# Patient Record
Sex: MALE | Race: WHITE | NOT HISPANIC OR LATINO | Employment: FULL TIME | URBAN - METROPOLITAN AREA
[De-identification: names, ages, dates, MRNs, and addresses within clinical notes are randomized per-mention and may not be internally consistent; named-entity substitution may affect disease eponyms.]

---

## 2023-07-11 DIAGNOSIS — Z12.5 ENCOUNTER FOR SCREENING FOR MALIGNANT NEOPLASM OF PROSTATE: ICD-10-CM

## 2023-07-11 DIAGNOSIS — E55.9 VITAMIN D DEFICIENCY: ICD-10-CM

## 2023-07-11 DIAGNOSIS — E53.8 VITAMIN B12 DEFICIENCY: ICD-10-CM

## 2023-07-11 DIAGNOSIS — Z00.00 ENCOUNTER FOR ANNUAL GENERAL MEDICAL EXAMINATION WITHOUT ABNORMAL FINDINGS IN ADULT: ICD-10-CM

## 2023-07-17 ENCOUNTER — LAB (OUTPATIENT)
Dept: LAB | Facility: LAB | Age: 53
End: 2023-07-17
Payer: COMMERCIAL

## 2023-07-17 DIAGNOSIS — E53.8 VITAMIN B12 DEFICIENCY: ICD-10-CM

## 2023-07-17 DIAGNOSIS — Z12.5 ENCOUNTER FOR SCREENING FOR MALIGNANT NEOPLASM OF PROSTATE: ICD-10-CM

## 2023-07-17 DIAGNOSIS — E55.9 VITAMIN D DEFICIENCY: ICD-10-CM

## 2023-07-17 DIAGNOSIS — Z00.00 ENCOUNTER FOR ANNUAL GENERAL MEDICAL EXAMINATION WITHOUT ABNORMAL FINDINGS IN ADULT: ICD-10-CM

## 2023-07-17 LAB
ALANINE AMINOTRANSFERASE (SGPT) (U/L) IN SER/PLAS: 29 U/L (ref 10–52)
ALBUMIN (G/DL) IN SER/PLAS: 4.7 G/DL (ref 3.4–5)
ALKALINE PHOSPHATASE (U/L) IN SER/PLAS: 73 U/L (ref 33–120)
ANION GAP IN SER/PLAS: 14 MMOL/L (ref 10–20)
ASPARTATE AMINOTRANSFERASE (SGOT) (U/L) IN SER/PLAS: 29 U/L (ref 9–39)
BASOPHILS (10*3/UL) IN BLOOD BY AUTOMATED COUNT: 0.06 X10E9/L (ref 0–0.1)
BASOPHILS/100 LEUKOCYTES IN BLOOD BY AUTOMATED COUNT: 0.9 % (ref 0–2)
BILIRUBIN TOTAL (MG/DL) IN SER/PLAS: 0.9 MG/DL (ref 0–1.2)
CALCIDIOL (25 OH VITAMIN D3) (NG/ML) IN SER/PLAS: 32 NG/ML
CALCIUM (MG/DL) IN SER/PLAS: 10 MG/DL (ref 8.6–10.6)
CARBON DIOXIDE, TOTAL (MMOL/L) IN SER/PLAS: 29 MMOL/L (ref 21–32)
CHLORIDE (MMOL/L) IN SER/PLAS: 105 MMOL/L (ref 98–107)
CHOLESTEROL (MG/DL) IN SER/PLAS: 216 MG/DL (ref 0–199)
CHOLESTEROL IN HDL (MG/DL) IN SER/PLAS: 51.7 MG/DL
CHOLESTEROL/HDL RATIO: 4.2
COBALAMIN (VITAMIN B12) (PG/ML) IN SER/PLAS: 556 PG/ML (ref 211–911)
CREATININE (MG/DL) IN SER/PLAS: 1.04 MG/DL (ref 0.5–1.3)
EOSINOPHILS (10*3/UL) IN BLOOD BY AUTOMATED COUNT: 0.08 X10E9/L (ref 0–0.7)
EOSINOPHILS/100 LEUKOCYTES IN BLOOD BY AUTOMATED COUNT: 1.2 % (ref 0–6)
ERYTHROCYTE DISTRIBUTION WIDTH (RATIO) BY AUTOMATED COUNT: 11.7 % (ref 11.5–14.5)
ERYTHROCYTE MEAN CORPUSCULAR HEMOGLOBIN CONCENTRATION (G/DL) BY AUTOMATED: 33 G/DL (ref 32–36)
ERYTHROCYTE MEAN CORPUSCULAR VOLUME (FL) BY AUTOMATED COUNT: 90 FL (ref 80–100)
ERYTHROCYTES (10*6/UL) IN BLOOD BY AUTOMATED COUNT: 5.08 X10E12/L (ref 4.5–5.9)
GFR MALE: 85 ML/MIN/1.73M2
GLUCOSE (MG/DL) IN SER/PLAS: 92 MG/DL (ref 74–99)
HEMATOCRIT (%) IN BLOOD BY AUTOMATED COUNT: 45.7 % (ref 41–52)
HEMOGLOBIN (G/DL) IN BLOOD: 15.1 G/DL (ref 13.5–17.5)
IMMATURE GRANULOCYTES/100 LEUKOCYTES IN BLOOD BY AUTOMATED COUNT: 0.1 % (ref 0–0.9)
LDL: 142 MG/DL (ref 0–99)
LEUKOCYTES (10*3/UL) IN BLOOD BY AUTOMATED COUNT: 6.7 X10E9/L (ref 4.4–11.3)
LYMPHOCYTES (10*3/UL) IN BLOOD BY AUTOMATED COUNT: 3.06 X10E9/L (ref 1.2–4.8)
LYMPHOCYTES/100 LEUKOCYTES IN BLOOD BY AUTOMATED COUNT: 45.5 % (ref 13–44)
MONOCYTES (10*3/UL) IN BLOOD BY AUTOMATED COUNT: 0.47 X10E9/L (ref 0.1–1)
MONOCYTES/100 LEUKOCYTES IN BLOOD BY AUTOMATED COUNT: 7 % (ref 2–10)
NEUTROPHILS (10*3/UL) IN BLOOD BY AUTOMATED COUNT: 3.05 X10E9/L (ref 1.2–7.7)
NEUTROPHILS/100 LEUKOCYTES IN BLOOD BY AUTOMATED COUNT: 45.3 % (ref 40–80)
NRBC (PER 100 WBCS) BY AUTOMATED COUNT: 0 /100 WBC (ref 0–0)
PLATELETS (10*3/UL) IN BLOOD AUTOMATED COUNT: 280 X10E9/L (ref 150–450)
POTASSIUM (MMOL/L) IN SER/PLAS: 4.7 MMOL/L (ref 3.5–5.3)
PROSTATE SPECIFIC ANTIGEN,SCREEN: 0.22 NG/ML (ref 0–4)
PROTEIN TOTAL: 7.1 G/DL (ref 6.4–8.2)
SODIUM (MMOL/L) IN SER/PLAS: 143 MMOL/L (ref 136–145)
THYROTROPIN (MIU/L) IN SER/PLAS BY DETECTION LIMIT <= 0.05 MIU/L: 0.69 MIU/L (ref 0.44–3.98)
TRIGLYCERIDE (MG/DL) IN SER/PLAS: 114 MG/DL (ref 0–149)
UREA NITROGEN (MG/DL) IN SER/PLAS: 10 MG/DL (ref 6–23)
VLDL: 23 MG/DL (ref 0–40)

## 2023-07-17 PROCEDURE — 82607 VITAMIN B-12: CPT

## 2023-07-17 PROCEDURE — 80061 LIPID PANEL: CPT

## 2023-07-17 PROCEDURE — 84443 ASSAY THYROID STIM HORMONE: CPT

## 2023-07-17 PROCEDURE — 85025 COMPLETE CBC W/AUTO DIFF WBC: CPT

## 2023-07-17 PROCEDURE — 82306 VITAMIN D 25 HYDROXY: CPT

## 2023-07-17 PROCEDURE — 84153 ASSAY OF PSA TOTAL: CPT

## 2023-07-17 PROCEDURE — 80053 COMPREHEN METABOLIC PANEL: CPT

## 2023-07-17 PROCEDURE — 36415 COLL VENOUS BLD VENIPUNCTURE: CPT

## 2023-07-19 ENCOUNTER — OFFICE VISIT (OUTPATIENT)
Dept: PRIMARY CARE | Facility: CLINIC | Age: 53
End: 2023-07-19
Payer: COMMERCIAL

## 2023-07-19 VITALS
RESPIRATION RATE: 16 BRPM | SYSTOLIC BLOOD PRESSURE: 156 MMHG | HEIGHT: 74 IN | HEART RATE: 68 BPM | OXYGEN SATURATION: 98 % | DIASTOLIC BLOOD PRESSURE: 81 MMHG | WEIGHT: 206.6 LBS | BODY MASS INDEX: 26.51 KG/M2 | TEMPERATURE: 97.7 F

## 2023-07-19 DIAGNOSIS — Z00.00 ENCOUNTER FOR ANNUAL GENERAL MEDICAL EXAMINATION WITHOUT ABNORMAL FINDINGS IN ADULT: Primary | ICD-10-CM

## 2023-07-19 DIAGNOSIS — E78.2 MIXED HYPERLIPIDEMIA: ICD-10-CM

## 2023-07-19 DIAGNOSIS — R01.1 CARDIAC MURMUR: ICD-10-CM

## 2023-07-19 DIAGNOSIS — I10 HTN (HYPERTENSION), BENIGN: ICD-10-CM

## 2023-07-19 PROCEDURE — 93000 ELECTROCARDIOGRAM COMPLETE: CPT | Performed by: FAMILY MEDICINE

## 2023-07-19 PROCEDURE — 3077F SYST BP >= 140 MM HG: CPT | Performed by: FAMILY MEDICINE

## 2023-07-19 PROCEDURE — 99386 PREV VISIT NEW AGE 40-64: CPT | Performed by: FAMILY MEDICINE

## 2023-07-19 PROCEDURE — 1036F TOBACCO NON-USER: CPT | Performed by: FAMILY MEDICINE

## 2023-07-19 PROCEDURE — 3079F DIAST BP 80-89 MM HG: CPT | Performed by: FAMILY MEDICINE

## 2023-07-19 PROCEDURE — 99204 OFFICE O/P NEW MOD 45 MIN: CPT | Performed by: FAMILY MEDICINE

## 2023-07-19 RX ORDER — LOSARTAN POTASSIUM 50 MG/1
50 TABLET ORAL DAILY
Qty: 90 TABLET | Refills: 0 | Status: SHIPPED | OUTPATIENT
Start: 2023-07-19 | End: 2023-08-24

## 2023-07-19 RX ORDER — MULTIVITAMIN
1 TABLET ORAL DAILY
COMMUNITY

## 2023-07-19 RX ORDER — BIOTIN 5 MG
5000 TABLET ORAL
COMMUNITY

## 2023-07-19 RX ORDER — ATORVASTATIN CALCIUM 10 MG/1
10 TABLET, FILM COATED ORAL DAILY
Qty: 90 TABLET | Refills: 1 | Status: SHIPPED | OUTPATIENT
Start: 2023-07-19 | End: 2023-09-18

## 2023-07-19 ASSESSMENT — ENCOUNTER SYMPTOMS
VOICE CHANGE: 0
BRUISES/BLEEDS EASILY: 0
DIARRHEA: 0
AGITATION: 0
COUGH: 0
NERVOUS/ANXIOUS: 0
OCCASIONAL FEELINGS OF UNSTEADINESS: 0
SINUS PRESSURE: 0
MYALGIAS: 0
DYSURIA: 0
EYE DISCHARGE: 0
EYE ITCHING: 0
ADENOPATHY: 0
DIAPHORESIS: 0
WOUND: 0
DIZZINESS: 0
VOMITING: 0
PALPITATIONS: 0
NAUSEA: 0
EYE REDNESS: 0
APPETITE CHANGE: 0
NECK STIFFNESS: 0
SORE THROAT: 0
FATIGUE: 0
JOINT SWELLING: 0
WHEEZING: 0
BACK PAIN: 0
TROUBLE SWALLOWING: 0
COLOR CHANGE: 0
POLYPHAGIA: 0
CHILLS: 0
CHEST TIGHTNESS: 0
ABDOMINAL PAIN: 0
ARTHRALGIAS: 0
FEVER: 0
DEPRESSION: 0
EYE PAIN: 0
ACTIVITY CHANGE: 0
POLYDIPSIA: 0
FREQUENCY: 0
SHORTNESS OF BREATH: 0
LOSS OF SENSATION IN FEET: 0
FACIAL SWELLING: 0
SLEEP DISTURBANCE: 0
UNEXPECTED WEIGHT CHANGE: 0
HEMATURIA: 0
BLOOD IN STOOL: 0
FLANK PAIN: 0
CONSTIPATION: 0
SINUS PAIN: 0
RHINORRHEA: 0

## 2023-07-19 ASSESSMENT — PATIENT HEALTH QUESTIONNAIRE - PHQ9
2. FEELING DOWN, DEPRESSED OR HOPELESS: NOT AT ALL
1. LITTLE INTEREST OR PLEASURE IN DOING THINGS: NOT AT ALL
SUM OF ALL RESPONSES TO PHQ9 QUESTIONS 1 AND 2: 0

## 2023-07-19 NOTE — PROGRESS NOTES
Subjective   Patient ID: Soto Rojas is a 53 y.o. male who presents for Annual Exam (New pt to establish care).  Today he is accompanied by alone.     Well Adult Physical   Patient here for a comprehensive physical exam.The patient reports no problems    Do you take any herbs or supplements that were not prescribed by a doctor? no   Are you taking calcium supplements? no   Are you taking aspirin daily? no     History:  Date last PSA: July/2023            Review of Systems   Constitutional:  Negative for activity change, appetite change, chills, diaphoresis, fatigue, fever and unexpected weight change.   HENT:  Negative for congestion, dental problem, drooling, ear discharge, ear pain, facial swelling, hearing loss, nosebleeds, postnasal drip, rhinorrhea, sinus pressure, sinus pain, sneezing, sore throat, tinnitus, trouble swallowing and voice change.    Eyes:  Negative for pain, discharge, redness, itching and visual disturbance.   Respiratory:  Negative for cough, chest tightness, shortness of breath and wheezing.    Cardiovascular:  Negative for chest pain, palpitations and leg swelling.   Gastrointestinal:  Negative for abdominal pain, blood in stool, constipation, diarrhea, nausea and vomiting.   Endocrine: Negative for cold intolerance, heat intolerance, polydipsia, polyphagia and polyuria.   Genitourinary:  Negative for decreased urine volume, dysuria, flank pain, frequency, hematuria and urgency.   Musculoskeletal:  Negative for arthralgias, back pain, gait problem, joint swelling, myalgias and neck stiffness.   Skin:  Negative for color change, pallor, rash and wound.   Neurological:  Negative for dizziness.   Hematological:  Negative for adenopathy. Does not bruise/bleed easily.   Psychiatric/Behavioral:  Negative for agitation, behavioral problems and sleep disturbance. The patient is not nervous/anxious.        Objective   Blood Pressure 156/81   Pulse 68   Temperature 36.5 °C (97.7 °F)    "Respiration 16   Height 1.88 m (6' 2\")   Weight 93.7 kg (206 lb 9.6 oz)   Oxygen Saturation 98%   Body Mass Index 26.53 kg/m²   BSA: 2.21 meters squared  Growth percentiles: Facility age limit for growth %amador is 20 years. Facility age limit for growth %amador is 20 years.     Physical Exam  Vitals and nursing note reviewed.   Constitutional:       General: He is not in acute distress.     Appearance: Normal appearance. He is normal weight. He is not ill-appearing.   HENT:      Head: Normocephalic.      Right Ear: Tympanic membrane, ear canal and external ear normal.      Left Ear: Tympanic membrane, ear canal and external ear normal.      Nose: Nose normal. No congestion or rhinorrhea.      Mouth/Throat:      Mouth: Mucous membranes are moist.      Pharynx: Oropharynx is clear.   Eyes:      Extraocular Movements: Extraocular movements intact.      Conjunctiva/sclera: Conjunctivae normal.      Pupils: Pupils are equal, round, and reactive to light.   Cardiovascular:      Rate and Rhythm: Normal rate and regular rhythm.      Pulses: Normal pulses.      Heart sounds: Murmur heard.   Pulmonary:      Effort: Pulmonary effort is normal. No respiratory distress.      Breath sounds: Normal breath sounds. No wheezing.   Abdominal:      General: Abdomen is flat. Bowel sounds are normal.      Palpations: Abdomen is soft.      Tenderness: There is no abdominal tenderness. There is no guarding or rebound.      Hernia: No hernia is present.   Musculoskeletal:         General: Normal range of motion.      Cervical back: Normal range of motion and neck supple. No rigidity or tenderness.      Right lower leg: No edema.      Left lower leg: No edema.   Lymphadenopathy:      Cervical: No cervical adenopathy.   Skin:     General: Skin is warm and dry.      Capillary Refill: Capillary refill takes more than 3 seconds.   Neurological:      General: No focal deficit present.      Mental Status: He is alert and oriented to person, " place, and time.      Sensory: No sensory deficit.      Motor: No weakness.      Coordination: Coordination normal.   Psychiatric:         Mood and Affect: Mood normal.         Behavior: Behavior normal.         Thought Content: Thought content normal.         Judgment: Judgment normal.         Assessment/Plan   Problem List Items Addressed This Visit       Mixed hyperlipidemia    Relevant Medications    atorvastatin (Lipitor) 10 mg tablet    HTN (hypertension), benign    Relevant Medications    losartan (Cozaar) 50 mg tablet    Cardiac murmur    Relevant Orders    ECG 12 lead (Completed)     Other Visit Diagnoses       Encounter for annual general medical examination without abnormal findings in adult    -  Primary    Relevant Orders    ECG 12 lead (Completed)        The 10-year ASCVD risk score (Nolberto LACY, et al., 2019) is: 7.6%    Values used to calculate the score:      Age: 53 years      Sex: Male      Is Non- : No      Diabetic: No      Tobacco smoker: No      Systolic Blood Pressure: 156 mmHg      Is BP treated: No      HDL Cholesterol: 51.7 mg/dL      Total Cholesterol: 216 mg/dL    Dear Mr. Rojas:     To help you more effectively manage your high blood pressure, we would like to remind you of the following preventive measures you can take at home:    1) Eat right: Your diet should be rich in fruits, vegetables, potassium, and low-fat dairy products. You should also reduce your intake of sodium and fats, particularly saturated fats.    2) Maintain a healthy weight: Try to achieve and maintain a healthy weight. If you are unsure what this means for you, please contact our clinic.    3) Exercise: Try to get at least 30 minutes of aerobic exercise every day.    4) Moderate your alcohol consumption: Limit your alcohol intake to one drink per day.    5) Monitor your blood pressure: You should check your blood pressure regularly. Notify our clinic if your blood pressure readings are  consistently higher than recommended.    We have included a personalized hypertension report card on the following page that lists your most recent blood pressure readings and lab results, along with your ideal values. If you have any questions or concerns about these instructions, your results, or your improving health, please don't hesitate to call.    Thank you for including us as members of your health care team.    [unfilled]    Sincerely,         Jocelyne Olivas MD    Enclosure      Hypertension Report Card for Soto Rojas  July 19, 2023:     Below is a summary of recent tests related to your hypertension that can help you manage your health.     Blood Pressure:   Normal blood pressure values are 120/80 or lower. Your blood pressure values should be less than 140/90. If your readings at home are consistently higher than this, please contact me.     Your most recent blood pressure readings at our clinic were:   BP Readings from Last 3 Encounters:   07/19/23 156/81       Creatinine:   High blood pressure can cause a loss of kidney function. Creatinine is a measure of this kidney function.      Your most recent creatinine levels were:   Creatinine (mg/dL)   Date Value   07/17/2023 1.04           Potassium:  Potassium is an electrolyte in your blood that can be affected by blood pressure treatment.     Your most recent potassium levels were:  Potassium (mmol/L)   Date Value   07/17/2023 4.7     F/U in 1 month

## 2023-07-19 NOTE — PATIENT INSTRUCTIONS
Current Outpatient Medications   Medication Sig Dispense Refill    biotin 5 mg tablet Take 1 tablet (5 mg) by mouth once daily.      multivitamin tablet Take 1 tablet by mouth once daily.      atorvastatin (Lipitor) 10 mg tablet Take 1 tablet (10 mg) by mouth once daily. 90 tablet 1    losartan (Cozaar) 50 mg tablet Take 1 tablet (50 mg) by mouth once daily. 90 tablet 0     No current facility-administered medications for this visit.     ou have high cholesterol. (hyperlipidemia). This increases your risk for cardiovascular disease.(Heart attack/stroke/circulation);  Continue any prescribed and advised OTC medications, in addition, as reminder:;   For high LDL (>130) use blueberries 1 cup daily;   Plant Stanol dose 950mg twice daily (may need to find online source for this dose), and ;    Metamucil/psyllium fiber 7 grams daily.;  ;  For high Triglycerides:;   Fish oil (start at 2000-3000mg daily);  ;   You should exercise 30 minutes daily. ;  ;   Your nutrition plan needs to emphasizes vegetables, fruits, and lean meat. I recommend the Mediterranean Diet ;  Please find this link to helpful information about lowering your cholesterol: http://www.aafp.org/afp/2010/0501/p1103.html;  Dear Mr. Rojas:     To help you more effectively manage your high blood pressure, we would like to remind you of the following preventive measures you can take at home:    1) Eat right: Your diet should be rich in fruits, vegetables, potassium, and low-fat dairy products. You should also reduce your intake of sodium and fats, particularly saturated fats.    2) Maintain a healthy weight: Try to achieve and maintain a healthy weight. If you are unsure what this means for you, please contact our clinic.    3) Exercise: Try to get at least 30 minutes of aerobic exercise every day.    4) Moderate your alcohol consumption: Limit your alcohol intake to one drink per day.    5) Monitor your blood pressure: You should check your blood pressure  regularly. Notify our clinic if your blood pressure readings are consistently higher than recommended.    We have included a personalized hypertension report card on the following page that lists your most recent blood pressure readings and lab results, along with your ideal values. If you have any questions or concerns about these instructions, your results, or your improving health, please don't hesitate to call.    Thank you for including us as members of your health care team.    [unfilled]    Sincerely,         Jocelyne Olivas MD    Enclosure      Hypertension Report Card for Soto Rojas  July 19, 2023:     Below is a summary of recent tests related to your hypertension that can help you manage your health.     Blood Pressure:   Normal blood pressure values are 120/80 or lower. Your blood pressure values should be less than 140/90. If your readings at home are consistently higher than this, please contact me.     Your most recent blood pressure readings at our clinic were:   BP Readings from Last 3 Encounters:   07/19/23 156/81       Creatinine:   High blood pressure can cause a loss of kidney function. Creatinine is a measure of this kidney function.      Your most recent creatinine levels were:   Creatinine (mg/dL)   Date Value   07/17/2023 1.04           Potassium:  Potassium is an electrolyte in your blood that can be affected by blood pressure treatment.     Your most recent potassium levels were:  Potassium (mmol/L)   Date Value   07/17/2023 4.7         F/U in 1 month for hyperlipidemia and BP

## 2023-08-23 ENCOUNTER — OFFICE VISIT (OUTPATIENT)
Dept: PRIMARY CARE | Facility: CLINIC | Age: 53
End: 2023-08-23
Payer: COMMERCIAL

## 2023-08-23 VITALS
OXYGEN SATURATION: 95 % | TEMPERATURE: 98.2 F | HEART RATE: 68 BPM | RESPIRATION RATE: 16 BRPM | WEIGHT: 206 LBS | DIASTOLIC BLOOD PRESSURE: 76 MMHG | BODY MASS INDEX: 26.45 KG/M2 | SYSTOLIC BLOOD PRESSURE: 122 MMHG

## 2023-08-23 DIAGNOSIS — I10 HTN (HYPERTENSION), BENIGN: Primary | ICD-10-CM

## 2023-08-23 DIAGNOSIS — E78.2 MIXED HYPERLIPIDEMIA: ICD-10-CM

## 2023-08-23 DIAGNOSIS — R01.1 CARDIAC MURMUR: ICD-10-CM

## 2023-08-23 DIAGNOSIS — Z11.4 SCREENING FOR HUMAN IMMUNODEFICIENCY VIRUS WITHOUT PRESENCE OF RISK FACTORS: ICD-10-CM

## 2023-08-23 DIAGNOSIS — R73.09 ABNORMAL GLUCOSE: ICD-10-CM

## 2023-08-23 DIAGNOSIS — Z11.59 ENCOUNTER FOR HEPATITIS C SCREENING TEST FOR LOW RISK PATIENT: ICD-10-CM

## 2023-08-23 PROCEDURE — 3074F SYST BP LT 130 MM HG: CPT | Performed by: FAMILY MEDICINE

## 2023-08-23 PROCEDURE — 99214 OFFICE O/P EST MOD 30 MIN: CPT | Performed by: FAMILY MEDICINE

## 2023-08-23 PROCEDURE — 3078F DIAST BP <80 MM HG: CPT | Performed by: FAMILY MEDICINE

## 2023-08-23 PROCEDURE — 1036F TOBACCO NON-USER: CPT | Performed by: FAMILY MEDICINE

## 2023-08-23 ASSESSMENT — ENCOUNTER SYMPTOMS
DEPRESSION: 0
POLYDIPSIA: 0
PND: 0
CHEST TIGHTNESS: 0
EYE REDNESS: 0
ORTHOPNEA: 0
BACK PAIN: 0
FATIGUE: 0
NECK PAIN: 0
VOMITING: 0
LOSS OF SENSATION IN FEET: 0
DIARRHEA: 0
SWEATS: 0
BLURRED VISION: 0
EYE ITCHING: 0
BLOOD IN STOOL: 0
SLEEP DISTURBANCE: 0
CONFUSION: 0
HEADACHES: 0
SHORTNESS OF BREATH: 0
SEIZURES: 0
OCCASIONAL FEELINGS OF UNSTEADINESS: 0
PALPITATIONS: 0
NERVOUS/ANXIOUS: 0
CHILLS: 0
ADENOPATHY: 0
AGITATION: 0
SINUS PAIN: 0
DYSPHORIC MOOD: 0
HYPERTENSION: 1
UNEXPECTED WEIGHT CHANGE: 0
FLANK PAIN: 0
BRUISES/BLEEDS EASILY: 0
WEAKNESS: 0
WOUND: 0
NAUSEA: 0

## 2023-08-23 ASSESSMENT — PATIENT HEALTH QUESTIONNAIRE - PHQ9
10. IF YOU CHECKED OFF ANY PROBLEMS, HOW DIFFICULT HAVE THESE PROBLEMS MADE IT FOR YOU TO DO YOUR WORK, TAKE CARE OF THINGS AT HOME, OR GET ALONG WITH OTHER PEOPLE: NOT DIFFICULT AT ALL
SUM OF ALL RESPONSES TO PHQ9 QUESTIONS 1 AND 2: 0
1. LITTLE INTEREST OR PLEASURE IN DOING THINGS: NOT AT ALL
2. FEELING DOWN, DEPRESSED OR HOPELESS: NOT AT ALL

## 2023-08-23 NOTE — PROGRESS NOTES
Subjective   Patient ID: Soto Rojas is a 53 y.o. male who presents for Hypertension (1 month follow up ).    Hypertension  This is a chronic problem. The current episode started 1 to 4 weeks ago. The problem has been gradually improving since onset. The problem is controlled. Pertinent negatives include no anxiety, blurred vision, chest pain, headaches, malaise/fatigue, neck pain, orthopnea, palpitations, peripheral edema, PND, shortness of breath or sweats. There are no associated agents to hypertension. Risk factors for coronary artery disease include dyslipidemia and family history. Past treatments include angiotensin blockers. The current treatment provides moderate improvement. Compliance problems include exercise.  There is no history of angina, kidney disease or CAD/MI. There is no history of chronic renal disease, coarctation of the aorta, hyperaldosteronism, hypercortisolism, hyperparathyroidism, a hypertension causing med, pheochromocytoma, renovascular disease, sleep apnea or a thyroid problem.        Review of Systems   Constitutional:  Negative for chills, fatigue, malaise/fatigue and unexpected weight change.   HENT:  Negative for ear discharge, hearing loss, postnasal drip and sinus pain.    Eyes:  Negative for blurred vision, redness, itching and visual disturbance.   Respiratory:  Negative for chest tightness and shortness of breath.    Cardiovascular:  Negative for chest pain, palpitations, orthopnea and PND.   Gastrointestinal:  Negative for blood in stool, diarrhea, nausea and vomiting.   Endocrine: Negative for heat intolerance and polydipsia.   Genitourinary:  Negative for enuresis, flank pain, genital sores, penile swelling and urgency.   Musculoskeletal:  Negative for back pain, gait problem and neck pain.   Skin:  Negative for pallor and wound.   Allergic/Immunologic: Negative for environmental allergies, food allergies and immunocompromised state.   Neurological:  Negative for  seizures, syncope, weakness and headaches.   Hematological:  Negative for adenopathy. Does not bruise/bleed easily.   Psychiatric/Behavioral:  Negative for agitation, confusion, dysphoric mood and sleep disturbance. The patient is not nervous/anxious.        Objective   Blood Pressure 122/76 (BP Location: Left arm, Patient Position: Sitting, BP Cuff Size: Large adult)   Pulse 68   Temperature 36.8 °C (98.2 °F)   Respiration 16   Weight 93.4 kg (206 lb)   Oxygen Saturation 95%   Body Mass Index 26.45 kg/m²     Physical Exam  Constitutional:       Appearance: Normal appearance. He is normal weight.   HENT:      Head: Normocephalic and atraumatic.      Right Ear: Tympanic membrane, ear canal and external ear normal.      Left Ear: Tympanic membrane, ear canal and external ear normal.      Nose: Nose normal.      Mouth/Throat:      Pharynx: Oropharynx is clear.   Eyes:      Pupils: Pupils are equal, round, and reactive to light.   Cardiovascular:      Rate and Rhythm: Normal rate and regular rhythm.      Pulses: Normal pulses.      Heart sounds: Normal heart sounds.   Pulmonary:      Effort: Pulmonary effort is normal.      Breath sounds: Normal breath sounds.   Abdominal:      General: Abdomen is flat. Bowel sounds are normal.      Palpations: Abdomen is soft.   Musculoskeletal:         General: Normal range of motion.      Cervical back: Normal range of motion and neck supple.   Skin:     General: Skin is warm and dry.      Capillary Refill: Capillary refill takes less than 2 seconds.   Neurological:      General: No focal deficit present.      Mental Status: He is alert and oriented to person, place, and time. Mental status is at baseline.   Psychiatric:         Mood and Affect: Mood normal.         Behavior: Behavior normal.         Thought Content: Thought content normal.         Judgment: Judgment normal.         Assessment/Plan   Problem List Items Addressed This Visit       Mixed hyperlipidemia    Relevant  Orders    Lipid Panel    CBC and Auto Differential    Comprehensive Metabolic Panel    HTN (hypertension), benign - Primary    Cardiac murmur     Other Visit Diagnoses       Abnormal glucose        Relevant Orders    Hemoglobin A1C    Encounter for hepatitis C screening test for low risk patient        Relevant Orders    Hepatitis C Antibody    Screening for human immunodeficiency virus without presence of risk factors        Relevant Orders    HIV 1/2 Antigen/Antibody Screen with Reflex to Confirmation          Current Outpatient Medications   Medication Sig Dispense Refill    atorvastatin (Lipitor) 10 mg tablet Take 1 tablet (10 mg) by mouth once daily. 90 tablet 1    biotin 5 mg tablet Take 1 tablet (5 mg) by mouth once daily.      losartan (Cozaar) 50 mg tablet Take 1 tablet (50 mg) by mouth once daily. 90 tablet 0    multivitamin tablet Take 1 tablet by mouth once daily.       No current facility-administered medications for this visit.       Patient is currently stable on current medications  Diabetes screening discussed and A1C to be done in office today  Would be interested in getting a Hep B vaccine  F/UP in 3 months for HTN and blood work

## 2023-08-23 NOTE — PATIENT INSTRUCTIONS
Current Outpatient Medications   Medication Sig Dispense Refill    atorvastatin (Lipitor) 10 mg tablet Take 1 tablet (10 mg) by mouth once daily. 90 tablet 1    biotin 5 mg tablet Take 1 tablet (5 mg) by mouth once daily.      losartan (Cozaar) 50 mg tablet Take 1 tablet (50 mg) by mouth once daily. 90 tablet 0    multivitamin tablet Take 1 tablet by mouth once daily.       No current facility-administered medications for this visit.     Patient is currently stable on current medications  Diabetes screening discussed and A1C ordered  Would be interested in getting a Hep B vaccine  F/UP in 3 months for HTN and blood work

## 2023-08-24 DIAGNOSIS — I10 HTN (HYPERTENSION), BENIGN: ICD-10-CM

## 2023-08-24 RX ORDER — LOSARTAN POTASSIUM 50 MG/1
50 TABLET ORAL DAILY
Qty: 90 TABLET | Refills: 1 | Status: SHIPPED | OUTPATIENT
Start: 2023-08-24 | End: 2024-03-11 | Stop reason: SDUPTHER

## 2023-09-18 DIAGNOSIS — E78.2 MIXED HYPERLIPIDEMIA: ICD-10-CM

## 2023-09-18 RX ORDER — ATORVASTATIN CALCIUM 10 MG/1
10 TABLET, FILM COATED ORAL DAILY
Qty: 90 TABLET | Refills: 1 | Status: SHIPPED | OUTPATIENT
Start: 2023-09-18 | End: 2023-11-27 | Stop reason: WASHOUT

## 2023-11-21 ENCOUNTER — LAB (OUTPATIENT)
Dept: LAB | Facility: LAB | Age: 53
End: 2023-11-21
Payer: COMMERCIAL

## 2023-11-21 DIAGNOSIS — E78.2 MIXED HYPERLIPIDEMIA: ICD-10-CM

## 2023-11-21 DIAGNOSIS — Z11.59 ENCOUNTER FOR HEPATITIS C SCREENING TEST FOR LOW RISK PATIENT: ICD-10-CM

## 2023-11-21 DIAGNOSIS — R73.09 ABNORMAL GLUCOSE: ICD-10-CM

## 2023-11-21 DIAGNOSIS — Z11.4 SCREENING FOR HUMAN IMMUNODEFICIENCY VIRUS WITHOUT PRESENCE OF RISK FACTORS: ICD-10-CM

## 2023-11-21 LAB
ALBUMIN SERPL BCP-MCNC: 5.1 G/DL (ref 3.4–5)
ALP SERPL-CCNC: 71 U/L (ref 33–120)
ALT SERPL W P-5'-P-CCNC: 47 U/L (ref 10–52)
ANION GAP SERPL CALC-SCNC: 12 MMOL/L (ref 10–20)
AST SERPL W P-5'-P-CCNC: 36 U/L (ref 9–39)
BASOPHILS # BLD AUTO: 0.05 X10*3/UL (ref 0–0.1)
BASOPHILS NFR BLD AUTO: 0.7 %
BILIRUB SERPL-MCNC: 0.9 MG/DL (ref 0–1.2)
BUN SERPL-MCNC: 12 MG/DL (ref 6–23)
CALCIUM SERPL-MCNC: 10.1 MG/DL (ref 8.6–10.6)
CHLORIDE SERPL-SCNC: 104 MMOL/L (ref 98–107)
CHOLEST SERPL-MCNC: 175 MG/DL (ref 0–199)
CHOLESTEROL/HDL RATIO: 3.2
CO2 SERPL-SCNC: 29 MMOL/L (ref 21–32)
CREAT SERPL-MCNC: 1.09 MG/DL (ref 0.5–1.3)
EOSINOPHIL # BLD AUTO: 0.12 X10*3/UL (ref 0–0.7)
EOSINOPHIL NFR BLD AUTO: 1.7 %
ERYTHROCYTE [DISTWIDTH] IN BLOOD BY AUTOMATED COUNT: 11.4 % (ref 11.5–14.5)
EST. AVERAGE GLUCOSE BLD GHB EST-MCNC: 100 MG/DL
GFR SERPL CREATININE-BSD FRML MDRD: 81 ML/MIN/1.73M*2
GLUCOSE SERPL-MCNC: 93 MG/DL (ref 74–99)
HBA1C MFR BLD: 5.1 %
HCT VFR BLD AUTO: 46 % (ref 41–52)
HCV AB SER QL: NONREACTIVE
HDLC SERPL-MCNC: 55.4 MG/DL
HGB BLD-MCNC: 15.3 G/DL (ref 13.5–17.5)
HIV 1+2 AB+HIV1 P24 AG SERPL QL IA: NONREACTIVE
IMM GRANULOCYTES # BLD AUTO: 0.02 X10*3/UL (ref 0–0.7)
IMM GRANULOCYTES NFR BLD AUTO: 0.3 % (ref 0–0.9)
LDLC SERPL CALC-MCNC: 101 MG/DL
LYMPHOCYTES # BLD AUTO: 3.12 X10*3/UL (ref 1.2–4.8)
LYMPHOCYTES NFR BLD AUTO: 45.3 %
MCH RBC QN AUTO: 30.6 PG (ref 26–34)
MCHC RBC AUTO-ENTMCNC: 33.3 G/DL (ref 32–36)
MCV RBC AUTO: 92 FL (ref 80–100)
MONOCYTES # BLD AUTO: 0.46 X10*3/UL (ref 0.1–1)
MONOCYTES NFR BLD AUTO: 6.7 %
NEUTROPHILS # BLD AUTO: 3.11 X10*3/UL (ref 1.2–7.7)
NEUTROPHILS NFR BLD AUTO: 45.3 %
NON HDL CHOLESTEROL: 120 MG/DL (ref 0–149)
NRBC BLD-RTO: 0 /100 WBCS (ref 0–0)
PLATELET # BLD AUTO: 303 X10*3/UL (ref 150–450)
POTASSIUM SERPL-SCNC: 4.4 MMOL/L (ref 3.5–5.3)
PROT SERPL-MCNC: 7.4 G/DL (ref 6.4–8.2)
RBC # BLD AUTO: 5 X10*6/UL (ref 4.5–5.9)
SODIUM SERPL-SCNC: 141 MMOL/L (ref 136–145)
TRIGL SERPL-MCNC: 95 MG/DL (ref 0–149)
VLDL: 19 MG/DL (ref 0–40)
WBC # BLD AUTO: 6.9 X10*3/UL (ref 4.4–11.3)

## 2023-11-21 PROCEDURE — 36415 COLL VENOUS BLD VENIPUNCTURE: CPT

## 2023-11-21 PROCEDURE — 83036 HEMOGLOBIN GLYCOSYLATED A1C: CPT

## 2023-11-21 PROCEDURE — 86803 HEPATITIS C AB TEST: CPT

## 2023-11-21 PROCEDURE — 80053 COMPREHEN METABOLIC PANEL: CPT

## 2023-11-21 PROCEDURE — 85025 COMPLETE CBC W/AUTO DIFF WBC: CPT

## 2023-11-21 PROCEDURE — 80061 LIPID PANEL: CPT

## 2023-11-21 PROCEDURE — 87389 HIV-1 AG W/HIV-1&-2 AB AG IA: CPT

## 2023-11-26 ASSESSMENT — ENCOUNTER SYMPTOMS
WEIGHT LOSS: 0
PARESTHESIAS: 0
TINGLING: 1
LEG PAIN: 1
PERIANAL NUMBNESS: 0
BACK PAIN: 1
ABDOMINAL PAIN: 0
FEVER: 0
WEAKNESS: 1
HEADACHES: 1
PARESIS: 0
NUMBNESS: 0
DYSURIA: 0
BOWEL INCONTINENCE: 0

## 2023-11-27 ENCOUNTER — HOSPITAL ENCOUNTER (OUTPATIENT)
Dept: RADIOLOGY | Facility: HOSPITAL | Age: 53
Discharge: HOME | End: 2023-11-27
Payer: COMMERCIAL

## 2023-11-27 ENCOUNTER — ANCILLARY PROCEDURE (OUTPATIENT)
Dept: RADIOLOGY | Facility: CLINIC | Age: 53
End: 2023-11-27
Payer: COMMERCIAL

## 2023-11-27 ENCOUNTER — OFFICE VISIT (OUTPATIENT)
Dept: PRIMARY CARE | Facility: CLINIC | Age: 53
End: 2023-11-27
Payer: COMMERCIAL

## 2023-11-27 VITALS
BODY MASS INDEX: 26.96 KG/M2 | WEIGHT: 210 LBS | OXYGEN SATURATION: 96 % | DIASTOLIC BLOOD PRESSURE: 80 MMHG | HEART RATE: 66 BPM | SYSTOLIC BLOOD PRESSURE: 138 MMHG

## 2023-11-27 DIAGNOSIS — M54.16 CHRONIC LEFT-SIDED LUMBAR RADICULOPATHY: ICD-10-CM

## 2023-11-27 DIAGNOSIS — M54.17 LUMBOSACRAL RADICULOPATHY: ICD-10-CM

## 2023-11-27 DIAGNOSIS — I10 ESSENTIAL (PRIMARY) HYPERTENSION: ICD-10-CM

## 2023-11-27 DIAGNOSIS — Z23 NEED FOR INFLUENZA VACCINATION: ICD-10-CM

## 2023-11-27 DIAGNOSIS — R29.898 WEAKNESS OF LEFT LOWER EXTREMITY: ICD-10-CM

## 2023-11-27 DIAGNOSIS — M54.16 CHRONIC LEFT-SIDED LUMBAR RADICULOPATHY: Primary | ICD-10-CM

## 2023-11-27 DIAGNOSIS — E78.2 MIXED HYPERLIPIDEMIA: ICD-10-CM

## 2023-11-27 PROBLEM — R73.09 ABNORMAL GLUCOSE: Status: ACTIVE | Noted: 2023-11-27

## 2023-11-27 PROCEDURE — 3075F SYST BP GE 130 - 139MM HG: CPT | Performed by: FAMILY MEDICINE

## 2023-11-27 PROCEDURE — 99215 OFFICE O/P EST HI 40 MIN: CPT | Performed by: FAMILY MEDICINE

## 2023-11-27 PROCEDURE — 3079F DIAST BP 80-89 MM HG: CPT | Performed by: FAMILY MEDICINE

## 2023-11-27 PROCEDURE — 90686 IIV4 VACC NO PRSV 0.5 ML IM: CPT | Performed by: FAMILY MEDICINE

## 2023-11-27 PROCEDURE — 96372 THER/PROPH/DIAG INJ SC/IM: CPT | Performed by: FAMILY MEDICINE

## 2023-11-27 PROCEDURE — 1036F TOBACCO NON-USER: CPT | Performed by: FAMILY MEDICINE

## 2023-11-27 PROCEDURE — 72110 X-RAY EXAM L-2 SPINE 4/>VWS: CPT

## 2023-11-27 PROCEDURE — 72110 X-RAY EXAM L-2 SPINE 4/>VWS: CPT | Performed by: RADIOLOGY

## 2023-11-27 PROCEDURE — 90471 IMMUNIZATION ADMIN: CPT | Performed by: FAMILY MEDICINE

## 2023-11-27 RX ORDER — METHYLPREDNISOLONE 4 MG/1
TABLET ORAL
Qty: 21 TABLET | Refills: 0 | Status: SHIPPED | OUTPATIENT
Start: 2023-11-27 | End: 2023-12-04

## 2023-11-27 RX ORDER — PANTOPRAZOLE SODIUM 40 MG/1
40 TABLET, DELAYED RELEASE ORAL DAILY
Qty: 30 TABLET | Refills: 1 | Status: SHIPPED | OUTPATIENT
Start: 2023-11-27 | End: 2024-01-29 | Stop reason: ALTCHOICE

## 2023-11-27 RX ORDER — KETOROLAC TROMETHAMINE 30 MG/ML
30 INJECTION, SOLUTION INTRAMUSCULAR; INTRAVENOUS ONCE
Status: COMPLETED | OUTPATIENT
Start: 2023-11-27 | End: 2023-11-27

## 2023-11-27 RX ORDER — METAXALONE 800 MG/1
800 TABLET ORAL 3 TIMES DAILY PRN
Qty: 30 TABLET | Refills: 0 | Status: SHIPPED | OUTPATIENT
Start: 2023-11-27 | End: 2023-12-12 | Stop reason: SDUPTHER

## 2023-11-27 RX ORDER — ATORVASTATIN CALCIUM 10 MG/1
10 TABLET, FILM COATED ORAL DAILY
Qty: 90 TABLET | Refills: 1 | COMMUNITY
Start: 2023-11-27 | End: 2024-01-16 | Stop reason: SDUPTHER

## 2023-11-27 RX ORDER — GABAPENTIN 300 MG/1
300 CAPSULE ORAL
Qty: 90 CAPSULE | Refills: 0 | Status: SHIPPED | OUTPATIENT
Start: 2023-11-27 | End: 2024-01-29 | Stop reason: ALTCHOICE

## 2023-11-27 RX ADMIN — KETOROLAC TROMETHAMINE 30 MG: 30 INJECTION, SOLUTION INTRAMUSCULAR; INTRAVENOUS at 12:50

## 2023-11-27 ASSESSMENT — ENCOUNTER SYMPTOMS
PERIANAL NUMBNESS: 0
TINGLING: 1
WEIGHT LOSS: 0
BOWEL INCONTINENCE: 0
BACK PAIN: 1
NUMBNESS: 0
PARESIS: 0
FEVER: 0
HEADACHES: 1
ABDOMINAL PAIN: 0
LEG PAIN: 1
PARESTHESIAS: 0
DYSURIA: 0
WEAKNESS: 1

## 2023-11-27 NOTE — ASSESSMENT & PLAN NOTE
Stable on current medications  Continue meds and exercise.   Dear Mr. Rojas:     To help you more effectively manage your high blood pressure, we would like to remind you of the following preventive measures you can take at home:    1) Eat right: Your diet should be rich in fruits, vegetables, potassium, and low-fat dairy products. You should also reduce your intake of sodium and fats, particularly saturated fats.    2) Maintain a healthy weight: Try to achieve and maintain a healthy weight. If you are unsure what this means for you, please contact our clinic.    3) Exercise: Try to get at least 30 minutes of aerobic exercise every day.    4) Moderate your alcohol consumption: Limit your alcohol intake to one drink per day.    5) Monitor your blood pressure: You should check your blood pressure regularly. Notify our clinic if your blood pressure readings are consistently higher than recommended.    We have included a personalized hypertension report card on the following page that lists your most recent blood pressure readings and lab results, along with your ideal values. If you have any questions or concerns about these instructions, your results, or your improving health, please don't hesitate to call.    Thank you for including us as members of your health care team.    [unfilled]    Sincerely,         Jocelyne Olivas MD    Enclosure      Hypertension Report Card for Soto Rojas  November 27, 2023:     Below is a summary of recent tests related to your hypertension that can help you manage your health.     Blood Pressure:   Normal blood pressure values are 120/80 or lower. Your blood pressure values should be less than 140/90. If your readings at home are consistently higher than this, please contact me.     Your most recent blood pressure readings at our clinic were:   BP Readings from Last 3 Encounters:   11/27/23 138/80   08/23/23 122/76   07/19/23 156/81       Creatinine:   High blood pressure can cause  a loss of kidney function. Creatinine is a measure of this kidney function.      Your most recent creatinine levels were:   Creatinine (mg/dL)   Date Value   11/21/2023 1.09   07/17/2023 1.04           Potassium:  Potassium is an electrolyte in your blood that can be affected by blood pressure treatment.     Your most recent potassium levels were:  Potassium (mmol/L)   Date Value   11/21/2023 4.4   07/17/2023 4.7

## 2023-11-27 NOTE — PATIENT INSTRUCTIONS
Current Outpatient Medications   Medication Sig Dispense Refill    biotin 5 mg tablet Take 1 tablet (5 mg) by mouth once daily.      losartan (Cozaar) 50 mg tablet Take 1 tablet (50 mg) by mouth once daily. 90 tablet 1    multivitamin tablet Take 1 tablet by mouth once daily.      atorvastatin (Lipitor) 10 mg tablet Take 1 tablet (10 mg) by mouth once daily. 90 tablet 1    gabapentin (Neurontin) 300 mg capsule Take 1 capsule (300 mg) by mouth once daily in the evening. Take with meals. 90 capsule 0    metaxalone (Skelaxin) 800 mg tablet Take 1 tablet (800 mg) by mouth 3 times a day as needed for muscle spasms. 30 tablet 0    methylPREDNISolone (Medrol Dospak) 4 mg tablets Take as directed on package. 21 tablet 0    pantoprazole (ProtoNix) 40 mg EC tablet Take 1 tablet (40 mg) by mouth once daily. Do not crush, chew, or split. 30 tablet 1     Current Facility-Administered Medications   Medication Dose Route Frequency Provider Last Rate Last Admin    ketorolac (Toradol) injection 30 mg  30 mg intramuscular Once Jocelyne Olivas MD         F/U in 2 months for low back pain or sooner if any worse  Call with any concerns with the medications   Dear Mr. Rojas:     To help you more effectively manage your high blood pressure, we would like to remind you of the following preventive measures you can take at home:    1) Eat right: Your diet should be rich in fruits, vegetables, potassium, and low-fat dairy products. You should also reduce your intake of sodium and fats, particularly saturated fats.    2) Maintain a healthy weight: Try to achieve and maintain a healthy weight. If you are unsure what this means for you, please contact our clinic.    3) Exercise: Try to get at least 30 minutes of aerobic exercise every day.    4) Moderate your alcohol consumption: Limit your alcohol intake to one drink per day.    5) Monitor your blood pressure: You should check your blood pressure regularly. Notify our clinic if your blood  pressure readings are consistently higher than recommended.    We have included a personalized hypertension report card on the following page that lists your most recent blood pressure readings and lab results, along with your ideal values. If you have any questions or concerns about these instructions, your results, or your improving health, please don't hesitate to call.    Thank you for including us as members of your health care team.    [unfilled]    Sincerely,         Jocelyne Olivas MD    Enclosure      Hypertension Report Card for Soto Rojas  December 3, 2023:     Below is a summary of recent tests related to your hypertension that can help you manage your health.     Blood Pressure:   Normal blood pressure values are 120/80 or lower. Your blood pressure values should be less than 140/90. If your readings at home are consistently higher than this, please contact me.     Your most recent blood pressure readings at our clinic were:   BP Readings from Last 3 Encounters:   11/27/23 138/80   08/23/23 122/76   07/19/23 156/81       Creatinine:   High blood pressure can cause a loss of kidney function. Creatinine is a measure of this kidney function.      Your most recent creatinine levels were:   Creatinine (mg/dL)   Date Value   11/21/2023 1.09   07/17/2023 1.04           Potassium:  Potassium is an electrolyte in your blood that can be affected by blood pressure treatment.     Your most recent potassium levels were:  Potassium (mmol/L)   Date Value   11/21/2023 4.4   07/17/2023 4.7

## 2023-11-27 NOTE — ASSESSMENT & PLAN NOTE
Stable on current medications  Continue meds and exercise.   ou have high cholesterol. (hyperlipidemia). This increases your risk for cardiovascular disease.(Heart attack/stroke/circulation);  Continue any prescribed and advised OTC medications, in addition, as reminder:;   For high LDL (>130) use blueberries 1 cup daily;   Plant Stanol dose 950mg twice daily (may need to find online source for this dose), and ;    Metamucil/psyllium fiber 7 grams daily.;  ;  For high Triglycerides:;   Fish oil (start at 2000-3000mg daily);  ;   You should exercise 30 minutes daily. ;  ;   Your nutrition plan needs to emphasizes vegetables, fruits, and lean meat. I recommend the Mediterranean Diet ;  Please find this link to helpful information about lowering your cholesterol: http://www.aafp.org/afp/2010/0501/p1103.html;

## 2023-11-27 NOTE — PROGRESS NOTES
Subjective   Patient ID: Soto Rojas is a 53 y.o. male who presents for Follow-up (BP, blood work).  Today he is accompanied by alone.     Subjective:   Soto Rojas is a 53 y.o. male with hypertension.  Current Outpatient Medications   Medication Sig Dispense Refill    biotin 5 mg tablet Take 1 tablet (5 mg) by mouth once daily.      losartan (Cozaar) 50 mg tablet Take 1 tablet (50 mg) by mouth once daily. 90 tablet 1    multivitamin tablet Take 1 tablet by mouth once daily.      atorvastatin (Lipitor) 10 mg tablet Take 1 tablet (10 mg) by mouth once daily. 90 tablet 1    gabapentin (Neurontin) 300 mg capsule Take 1 capsule (300 mg) by mouth once daily in the evening. Take with meals. 90 capsule 0    metaxalone (Skelaxin) 800 mg tablet Take 1 tablet (800 mg) by mouth 3 times a day as needed for muscle spasms. 30 tablet 0    methylPREDNISolone (Medrol Dospak) 4 mg tablets Take as directed on package. 21 tablet 0    pantoprazole (ProtoNix) 40 mg EC tablet Take 1 tablet (40 mg) by mouth once daily. Do not crush, chew, or split. 30 tablet 1     No current facility-administered medications for this visit.      Hypertension ROS: taking medications as instructed, no medication side effects noted, no TIA's, no chest pain on exertion, no dyspnea on exertion, no swelling of ankles, no orthostatic dizziness or lightheadedness, no orthopnea or paroxysmal nocturnal dyspnea, and no palpitations.   New concerns: just low back pain.     Objective:   Blood Pressure 138/80   Pulse 66   Weight 95.3 kg (210 lb)   Oxygen Saturation 96%   Body Mass Index 26.96 kg/m²      Back Pain  This is a chronic problem. The current episode started more than 1 month ago. The problem occurs constantly. The problem has been waxing and waning since onset. The pain is present in the sacro-iliac. The quality of the pain is described as aching and stabbing. The pain radiates to the left knee and left thigh. The pain is at a severity of 8/10.  The pain is The same all the time. The symptoms are aggravated by bending and twisting. Stiffness is present All day. Associated symptoms include headaches, leg pain, pelvic pain, tingling and weakness. Pertinent negatives include no abdominal pain, bladder incontinence, bowel incontinence, chest pain, dysuria, fever, numbness, paresis, paresthesias, perianal numbness or weight loss. Risk factors include history of cancer and lack of exercise.   Left leg feels very weak and has trouble walking up and down the steps. The leg have given out from under him and is progressively getting worse. Unable to bend and  anything or put his socks and shoes on.   \he had surgery 20 years ago  Has been doing HEP for the last 2 months and Ibuprofen daily    Review of Systems   Constitutional:  Negative for fever and weight loss.   Cardiovascular:  Negative for chest pain.   Gastrointestinal:  Negative for abdominal pain and bowel incontinence.   Genitourinary:  Positive for pelvic pain. Negative for bladder incontinence and dysuria.   Musculoskeletal:  Positive for back pain.   Neurological:  Positive for tingling, weakness and headaches. Negative for numbness and paresthesias.       Objective   Blood Pressure 138/80   Pulse 66   Weight 95.3 kg (210 lb)   Oxygen Saturation 96%   Body Mass Index 26.96 kg/m²   BSA: 2.23 meters squared  Growth percentiles: Facility age limit for growth %amador is 20 years. Facility age limit for growth %amador is 20 years.     Physical Exam  Vitals and nursing note reviewed.   Constitutional:       General: He is not in acute distress.     Appearance: Normal appearance. He is normal weight. He is not ill-appearing.   HENT:      Head: Normocephalic.      Right Ear: Tympanic membrane, ear canal and external ear normal.      Left Ear: Tympanic membrane, ear canal and external ear normal.      Nose: Nose normal. No rhinorrhea.      Mouth/Throat:      Mouth: Mucous membranes are moist.      Pharynx:  Oropharynx is clear.   Eyes:      Extraocular Movements: Extraocular movements intact.      Conjunctiva/sclera: Conjunctivae normal.      Pupils: Pupils are equal, round, and reactive to light.   Cardiovascular:      Rate and Rhythm: Normal rate and regular rhythm.      Pulses: Normal pulses.      Heart sounds: Normal heart sounds.   Pulmonary:      Effort: Pulmonary effort is normal. No respiratory distress.      Breath sounds: Normal breath sounds. No wheezing.   Abdominal:      General: Abdomen is flat. Bowel sounds are normal.      Palpations: Abdomen is soft.      Tenderness: There is no abdominal tenderness. There is no guarding or rebound.      Hernia: No hernia is present.   Musculoskeletal:      Cervical back: Normal range of motion and neck supple. No rigidity or tenderness.      Lumbar back: Spasms, tenderness and bony tenderness present. No swelling, edema, deformity, signs of trauma or lacerations. Decreased range of motion. Positive left straight leg raise test. Negative right straight leg raise test. No scoliosis.      Right lower leg: No edema.      Left lower leg: No edema.      Comments: Left leg extensor weakness   Lymphadenopathy:      Cervical: No cervical adenopathy.   Skin:     General: Skin is warm and dry.      Capillary Refill: Capillary refill takes more than 3 seconds.   Neurological:      General: No focal deficit present.      Mental Status: He is alert and oriented to person, place, and time.      Sensory: No sensory deficit.      Motor: No weakness.      Coordination: Coordination normal.   Psychiatric:         Mood and Affect: Mood normal.         Behavior: Behavior normal.         Thought Content: Thought content normal.         Judgment: Judgment normal.         Assessment/Plan   Problem List Items Addressed This Visit             ICD-10-CM    Mixed hyperlipidemia E78.2     Stable on current medications  Continue meds and exercise.   ou have high cholesterol. (hyperlipidemia). This  increases your risk for cardiovascular disease.(Heart attack/stroke/circulation);  Continue any prescribed and advised OTC medications, in addition, as reminder:;   For high LDL (>130) use blueberries 1 cup daily;   Plant Stanol dose 950mg twice daily (may need to find online source for this dose), and ;    Metamucil/psyllium fiber 7 grams daily.;  ;  For high Triglycerides:;   Fish oil (start at 2000-3000mg daily);  ;   You should exercise 30 minutes daily. ;  ;   Your nutrition plan needs to emphasizes vegetables, fruits, and lean meat. I recommend the Mediterranean Diet ;  Please find this link to helpful information about lowering your cholesterol: http://www.aafp.org/afp/2010/0501/p1103.html;           Relevant Medications    atorvastatin (Lipitor) 10 mg tablet    Essential (primary) hypertension I10     Stable on current medications  Continue meds and exercise.   Dear Mr. Rojas:     To help you more effectively manage your high blood pressure, we would like to remind you of the following preventive measures you can take at home:    1) Eat right: Your diet should be rich in fruits, vegetables, potassium, and low-fat dairy products. You should also reduce your intake of sodium and fats, particularly saturated fats.    2) Maintain a healthy weight: Try to achieve and maintain a healthy weight. If you are unsure what this means for you, please contact our clinic.    3) Exercise: Try to get at least 30 minutes of aerobic exercise every day.    4) Moderate your alcohol consumption: Limit your alcohol intake to one drink per day.    5) Monitor your blood pressure: You should check your blood pressure regularly. Notify our clinic if your blood pressure readings are consistently higher than recommended.    We have included a personalized hypertension report card on the following page that lists your most recent blood pressure readings and lab results, along with your ideal values. If you have any questions or concerns  about these instructions, your results, or your improving health, please don't hesitate to call.    Thank you for including us as members of your health care team.    [unfilled]    Sincerely,         Jocelyne Olivas MD    Enclosure    Hypertension Report Card for Soto Rojas  November 27, 2023:     Below is a summary of recent tests related to your hypertension that can help you manage your health.     Blood Pressure:   Normal blood pressure values are 120/80 or lower. Your blood pressure values should be less than 140/90. If your readings at home are consistently higher than this, please contact me.     Your most recent blood pressure readings at our clinic were:   BP Readings from Last 3 Encounters:   11/27/23 138/80   08/23/23 122/76   07/19/23 156/81     Creatinine:   High blood pressure can cause a loss of kidney function. Creatinine is a measure of this kidney function.      Your most recent creatinine levels were:   Creatinine (mg/dL)   Date Value   11/21/2023 1.09   07/17/2023 1.04         Potassium:  Potassium is an electrolyte in your blood that can be affected by blood pressure treatment.     Your most recent potassium levels were:  Potassium (mmol/L)   Date Value   11/21/2023 4.4   07/17/2023 4.7            Chronic left-sided lumbar radiculopathy - Primary M54.16    Relevant Medications    metaxalone (Skelaxin) 800 mg tablet    methylPREDNISolone (Medrol Dospak) 4 mg tablets    pantoprazole (ProtoNix) 40 mg EC tablet    gabapentin (Neurontin) 300 mg capsule    Other Relevant Orders    XR lumbar spine complete 4+ views (Completed)    Weakness of left lower extremity R29.898     Other Visit Diagnoses       Diagnosis Codes    Need for influenza vaccination     Z23    Relevant Orders    Flu vaccine (IIV4) age 6 months and greater, preservative free (Completed)    Lumbosacral radiculopathy     M54.17    Relevant Orders    XR lumbar spine complete 4+ views (Completed)          Current Outpatient  Medications   Medication Sig Dispense Refill    biotin 5 mg tablet Take 1 tablet (5 mg) by mouth once daily.      losartan (Cozaar) 50 mg tablet Take 1 tablet (50 mg) by mouth once daily. 90 tablet 1    multivitamin tablet Take 1 tablet by mouth once daily.      atorvastatin (Lipitor) 10 mg tablet Take 1 tablet (10 mg) by mouth once daily. 90 tablet 1    gabapentin (Neurontin) 300 mg capsule Take 1 capsule (300 mg) by mouth once daily in the evening. Take with meals. 90 capsule 0    metaxalone (Skelaxin) 800 mg tablet Take 1 tablet (800 mg) by mouth 3 times a day as needed for muscle spasms. 30 tablet 0    methylPREDNISolone (Medrol Dospak) 4 mg tablets Take as directed on package. 21 tablet 0    pantoprazole (ProtoNix) 40 mg EC tablet Take 1 tablet (40 mg) by mouth once daily. Do not crush, chew, or split. 30 tablet 1     No current facility-administered medications for this visit.       F/U in 2 months for low back pain or sooner if any worse  Call with any concerns with the medications     Dear  Bob:     To help you more effectively manage your high blood pressure, we would like to remind you of the following preventive measures you can take at home:    1) Eat right: Your diet should be rich in fruits, vegetables, potassium, and low-fat dairy products. You should also reduce your intake of sodium and fats, particularly saturated fats.    2) Maintain a healthy weight: Try to achieve and maintain a healthy weight. If you are unsure what this means for you, please contact our clinic.    3) Exercise: Try to get at least 30 minutes of aerobic exercise every day.    4) Moderate your alcohol consumption: Limit your alcohol intake to one drink per day.    5) Monitor your blood pressure: You should check your blood pressure regularly. Notify our clinic if your blood pressure readings are consistently higher than recommended.    We have included a personalized hypertension report card on the following page that lists  your most recent blood pressure readings and lab results, along with your ideal values. If you have any questions or concerns about these instructions, your results, or your improving health, please don't hesitate to call.    Thank you for including us as members of your health care team.    [unfilled]    Sincerely,         Jocelyne Olivas MD    Enclosure      Hypertension Report Card for Soto Rojas  December 3, 2023:     Below is a summary of recent tests related to your hypertension that can help you manage your health.     Blood Pressure:   Normal blood pressure values are 120/80 or lower. Your blood pressure values should be less than 140/90. If your readings at home are consistently higher than this, please contact me.     Your most recent blood pressure readings at our clinic were:   BP Readings from Last 3 Encounters:   11/27/23 138/80   08/23/23 122/76   07/19/23 156/81       Creatinine:   High blood pressure can cause a loss of kidney function. Creatinine is a measure of this kidney function.      Your most recent creatinine levels were:   Creatinine (mg/dL)   Date Value   11/21/2023 1.09   07/17/2023 1.04           Potassium:  Potassium is an electrolyte in your blood that can be affected by blood pressure treatment.     Your most recent potassium levels were:  Potassium (mmol/L)   Date Value   11/21/2023 4.4   07/17/2023 4.7

## 2023-11-29 ENCOUNTER — PATIENT MESSAGE (OUTPATIENT)
Dept: PRIMARY CARE | Facility: CLINIC | Age: 53
End: 2023-11-29

## 2023-12-04 ENCOUNTER — PATIENT MESSAGE (OUTPATIENT)
Dept: PRIMARY CARE | Facility: CLINIC | Age: 53
End: 2023-12-04
Payer: COMMERCIAL

## 2023-12-04 DIAGNOSIS — M79.605 PAIN OF LEFT LOWER EXTREMITY: ICD-10-CM

## 2023-12-04 DIAGNOSIS — M54.50 LOW BACK PAIN, UNSPECIFIED BACK PAIN LATERALITY, UNSPECIFIED CHRONICITY, UNSPECIFIED WHETHER SCIATICA PRESENT: ICD-10-CM

## 2023-12-11 DIAGNOSIS — M54.16 CHRONIC LEFT-SIDED LUMBAR RADICULOPATHY: ICD-10-CM

## 2023-12-13 RX ORDER — METAXALONE 800 MG/1
800 TABLET ORAL 3 TIMES DAILY PRN
Qty: 30 TABLET | Refills: 0 | Status: SHIPPED | OUTPATIENT
Start: 2023-12-13 | End: 2024-01-29 | Stop reason: ALTCHOICE

## 2024-01-16 ENCOUNTER — EVALUATION (OUTPATIENT)
Dept: PHYSICAL THERAPY | Facility: CLINIC | Age: 54
End: 2024-01-16
Payer: COMMERCIAL

## 2024-01-16 DIAGNOSIS — M79.605 PAIN OF LEFT LOWER EXTREMITY: ICD-10-CM

## 2024-01-16 DIAGNOSIS — E78.2 MIXED HYPERLIPIDEMIA: ICD-10-CM

## 2024-01-16 DIAGNOSIS — M54.50 LOW BACK PAIN, UNSPECIFIED BACK PAIN LATERALITY, UNSPECIFIED CHRONICITY, UNSPECIFIED WHETHER SCIATICA PRESENT: Primary | ICD-10-CM

## 2024-01-16 PROCEDURE — 97161 PT EVAL LOW COMPLEX 20 MIN: CPT | Mod: GP | Performed by: PHYSICAL THERAPIST

## 2024-01-16 PROCEDURE — 97110 THERAPEUTIC EXERCISES: CPT | Mod: GP | Performed by: PHYSICAL THERAPIST

## 2024-01-16 RX ORDER — ATORVASTATIN CALCIUM 10 MG/1
10 TABLET, FILM COATED ORAL DAILY
Qty: 90 TABLET | Refills: 1 | Status: SHIPPED | OUTPATIENT
Start: 2024-01-16

## 2024-01-16 ASSESSMENT — PATIENT HEALTH QUESTIONNAIRE - PHQ9
1. LITTLE INTEREST OR PLEASURE IN DOING THINGS: NOT AT ALL
SUM OF ALL RESPONSES TO PHQ9 QUESTIONS 1 AND 2: 0
2. FEELING DOWN, DEPRESSED OR HOPELESS: NOT AT ALL

## 2024-01-16 ASSESSMENT — ENCOUNTER SYMPTOMS
OCCASIONAL FEELINGS OF UNSTEADINESS: 0
LOSS OF SENSATION IN FEET: 0
DEPRESSION: 0

## 2024-01-16 ASSESSMENT — PAIN SCALES - GENERAL: PAINLEVEL_OUTOF10: 4

## 2024-01-16 ASSESSMENT — PAIN - FUNCTIONAL ASSESSMENT: PAIN_FUNCTIONAL_ASSESSMENT: 0-10

## 2024-01-16 ASSESSMENT — PAIN DESCRIPTION - DESCRIPTORS: DESCRIPTORS: ACHING

## 2024-01-16 NOTE — PROGRESS NOTES
Physical Therapy    Physical Therapy Lumbar Spine Evaluation    Patient Name: Soto Rojas  MRN: [id number]  Today's Date: 1/16/2024  Time Calculation  Start Time: 0815  Stop Time: 0915  Time Calculation (min): 60 min    Current Problem  Problem List Items Addressed This Visit             ICD-10-CM    Low back pain - Primary M54.50    Pain of left lower extremity M79.605      Precautions  Precautions  Precautions Comment: none     Pain  Pain Assessment: 0-10  Pain Score: 4 (5-6/10 at worst)  Pain Location: Back  Pain Orientation: Lower, Left  Pain Radiating Towards: L shin  Pain Descriptors: Aching  Pain Frequency: Constant/continuous    SUBJECTIVE:   Chief complaint:  Pt reports he had an onset of LBP after lifting a heavy object in August 2023. Notes pain progressively worsened with some pain in the L buttock and L lateral shin. Notes pain is more constant but does feels pain go away. Notes difficulty with bending and feeling stiff in the lumbar spine. Denies any significant weakness or giving way in the low back or L LE.      Imaging: x-rays were unremarkable     Pain Better: ibuprofen, walking     Pain Worse: bending, standing    Prior level of function: previously independent with all prior ADL's and lifting.   Current limitations: bending/lifting, standing prolonged    Home setup: reviewed and no concern    Work:      Patients goal: relieve pain in leg     Prior tx: no prior PT tx this yr. Did home stretches with no relief    Medical hx has been reviewed with the patient.     Objective:    Lower Extremity ROM: WNL unless documented below:    Lower Extremity Strength:  Date: eval Myotome RIGHT LEFT   Hip Flexion L1,2 4+ 4   Hip ext  5 5   Hip abd  4+ 4+   Hip add  4+ 4+   Knee Extension L3 5 4+   Knee Flexion  5 4   Ankle DF L4  4   Ankle PF S1 5 4+     Lumbar ROM:  Date: eval Percentage    Flexion 50% Pain    Extension 25% pain buttock L     RIGHT LEFT   Side Bend WNL WNL   Rotation WNL 75%      Joint mobility: Lumbar PA hypomobile  Posture: Slight reduction in lumbar ext segmental mobility with ext, slight decrease in lumbar lordosis, slight L sociliosis thoracic spine.   Palpation: No tenderness to palpation  Special tests:  Lumbar: (+) Slump L LE  Noy Repeated Movements: baseline pain 4/10 L low back and L lateral shin  RFIS 10 reps, produced, worse to 5/10  GWEN 10 reps, produced, NB/NW  Prone lying- some reduction in pain   REIL 10 reps- reduces, better, 10 more reps better 2/10 with improved strength, 10 more reps no change  REIL with self overpressure 10 reps better but still pain L shin  REIL with clinician overpressure better, shin pain reduced further.     Other Measures  Oswestry Disablity Index (ESTELA): 30%     TREATMENT:  Eval  2. Instruction in HEP:  Access Code: [email or web address]  URL: [email or web address]  Date: 01/16/2024  Prepared by:  Felix    Exercises  - Lying Prone  - 2 x daily - 7 x weekly - 1 reps - 5 min  hold  - Static Prone on Elbows  - 2 x daily - 7 x weekly - 1 reps - 1 minute hold  - Prone Press Up  - 6 x daily - 7 x weekly - 1 sets - 10 reps  - Standing Lumbar Extension  - 6 x daily - 7 x weekly - 1 sets - 10 reps    ASSESSEMENT  Pt is a 53 y.o. referred to therapy for dx of LBP by Jocelyne Olivas MD . Pt presents with low back pain, loss of motion, loss of strength, reduced posture and reduced function.Pt with signs and symptoms associated with pain of a mechanical origin with extension bias that this pt would benefit from a therapy program to restore prior level of function, decrease pain, increase AROM, increase strength and improve posture.    The physical therapy prognosis is good for the patient to achieve their goals.   The pt tolerated therapy treatment today well with no adverse effects.  Barriers to therapy include:  none     PLAN  The pt will be seen 1-2 days a week for 4-6 weeks.      The pt has been educated about the risks and benefits  of physical therapy including manual therapy treatments. Pt agrees with POC and gives consent for treatment.     The patient will benefit from physical therapy treatment to include: therapeutic exercises, therapeutic activities, neurological re-education, manual therapy, modalities, and a home exercise program.     Goals:  Active       PT Problem       Reduce pain at worst to 2-3/10 with all prior activity.        Start:  01/16/24    Expected End:  01/31/24            Pt to sit with good posture approx 50-75% of the time.        Start:  01/16/24    Expected End:  01/31/24            Reduce pain at worst to 0-1/10 with all prior activity.        Start:  01/16/24    Expected End:  02/27/24            Pt to increase LE strength to grossly 5/5 for improved gait, stairs, lifting and ADL's.        Start:  01/16/24    Expected End:  02/27/24            Increase lumbar flexion to 100%, ext to %, rotation bilat to 100% for self care, posture and ADL's.        Start:  01/16/24    Expected End:  02/27/24            Pt to have decrease in Oswestry by 10% to display increased overall function.        Start:  01/16/24    Expected End:  02/27/24            Pt to be independent with a HEP for self management.        Start:  01/16/24    Expected End:  02/27/24

## 2024-01-16 NOTE — TELEPHONE ENCOUNTER
Fax request [facility] for medication:  atorvastatin (Lipitor) 10 mg tablet  Taken Once Daily  Quantity: 90

## 2024-01-24 ENCOUNTER — TREATMENT (OUTPATIENT)
Dept: PHYSICAL THERAPY | Facility: CLINIC | Age: 54
End: 2024-01-24
Payer: COMMERCIAL

## 2024-01-24 DIAGNOSIS — M54.50 LOW BACK PAIN, UNSPECIFIED BACK PAIN LATERALITY, UNSPECIFIED CHRONICITY, UNSPECIFIED WHETHER SCIATICA PRESENT: Primary | ICD-10-CM

## 2024-01-24 DIAGNOSIS — M79.605 PAIN OF LEFT LOWER EXTREMITY: ICD-10-CM

## 2024-01-24 PROCEDURE — 97110 THERAPEUTIC EXERCISES: CPT | Mod: GP,CQ

## 2024-01-24 ASSESSMENT — PAIN - FUNCTIONAL ASSESSMENT: PAIN_FUNCTIONAL_ASSESSMENT: 0-10

## 2024-01-24 ASSESSMENT — PAIN DESCRIPTION - DESCRIPTORS: DESCRIPTORS: ACHING

## 2024-01-24 ASSESSMENT — PAIN SCALES - GENERAL: PAINLEVEL_OUTOF10: 2

## 2024-01-24 NOTE — PROGRESS NOTES
Physical Therapy Treatment    Patient Name: Soto Rojas  MRN: [id number]  Today's Date: 1/24/2024  Time Calculation  Start Time: 1133  Stop Time: 1212  Time Calculation (min): 39 min    Current Problem:  Problem List Items Addressed This Visit             ICD-10-CM    Low back pain - Primary M54.50    Pain of left lower extremity M79.605       Subjective   General:   Low back in to L LE to lat shin persists but is better, less intense. Did not notice shin pain last week.   Overall positive response to prone extension biased exercises, compliant with performing every 2-3 hrs.      Pain:  Pain Assessment: 0-10  Pain Score: 2  Pain Location: Back  Pain Orientation: Left, Lower  Pain Radiating Towards:  (L shin)  Pain Descriptors: Aching  Pain Frequency: Constant/continuous    Precautions:  Precautions  Precautions Comment: none      Objective   No objective measures taken this visit    Treatment:  Therapeutic exercise  Prone lying x 1 min  PPU with man OP x10   Prone hip ext 2 sec hold 2x10, BL   Prone HS curl 2x10, BL (discharge after this session- too easy)  Seated piriformis stretch x1 min, BL   Standing HSS x 1 min, BL   BL SLR 2x10   S/L clamshell 2x10   Rows GTB 2x10  Pull downs GTB 2x10     Neuromuscular Re-education       Manual     Modalities      Assessment   Pt to cont prone extension as long as responds favorably with goal of centralization. Discussed and educated pt on goal of centralization with exercises and importance of body mechanics to decrease re injury throughout the day. Pt tolerated therex well without increased symptoms. No increased pain or LE symptoms post tx.       Plan    Continue to progress POC as tolerated by patient to improve strength, mobility and overall function    Goals:  Active       PT Problem       Reduce pain at worst to 2-3/10 with all prior activity.        Start:  01/16/24    Expected End:  01/31/24            Pt to sit with good posture approx 50-75% of the time.         Start:  01/16/24    Expected End:  01/31/24            Reduce pain at worst to 0-1/10 with all prior activity.        Start:  01/16/24    Expected End:  02/27/24            Pt to increase LE strength to grossly 5/5 for improved gait, stairs, lifting and ADL's.        Start:  01/16/24    Expected End:  02/27/24            Increase lumbar flexion to 100%, ext to %, rotation bilat to 100% for self care, posture and ADL's.        Start:  01/16/24    Expected End:  02/27/24            Pt to have decrease in Oswestry by 10% to display increased overall function.        Start:  01/16/24    Expected End:  02/27/24            Pt to be independent with a HEP for self management.        Start:  01/16/24    Expected End:  02/27/24

## 2024-01-29 ENCOUNTER — OFFICE VISIT (OUTPATIENT)
Dept: PRIMARY CARE | Facility: CLINIC | Age: 54
End: 2024-01-29
Payer: COMMERCIAL

## 2024-01-29 VITALS
BODY MASS INDEX: 26.83 KG/M2 | DIASTOLIC BLOOD PRESSURE: 76 MMHG | HEART RATE: 70 BPM | TEMPERATURE: 97.4 F | WEIGHT: 209 LBS | SYSTOLIC BLOOD PRESSURE: 118 MMHG | OXYGEN SATURATION: 96 %

## 2024-01-29 DIAGNOSIS — E78.2 MIXED HYPERLIPIDEMIA: ICD-10-CM

## 2024-01-29 DIAGNOSIS — M79.605 PAIN OF LEFT LOWER EXTREMITY: ICD-10-CM

## 2024-01-29 DIAGNOSIS — I10 ESSENTIAL (PRIMARY) HYPERTENSION: ICD-10-CM

## 2024-01-29 DIAGNOSIS — M54.16 CHRONIC LEFT-SIDED LUMBAR RADICULOPATHY: Primary | ICD-10-CM

## 2024-01-29 PROBLEM — M54.50 LOW BACK PAIN: Status: RESOLVED | Noted: 2024-01-16 | Resolved: 2024-01-29

## 2024-01-29 PROBLEM — R29.898 WEAKNESS OF LEFT LOWER EXTREMITY: Status: RESOLVED | Noted: 2023-11-27 | Resolved: 2024-01-29

## 2024-01-29 PROCEDURE — 3078F DIAST BP <80 MM HG: CPT | Performed by: FAMILY MEDICINE

## 2024-01-29 PROCEDURE — 3074F SYST BP LT 130 MM HG: CPT | Performed by: FAMILY MEDICINE

## 2024-01-29 PROCEDURE — 1036F TOBACCO NON-USER: CPT | Performed by: FAMILY MEDICINE

## 2024-01-29 PROCEDURE — 99214 OFFICE O/P EST MOD 30 MIN: CPT | Performed by: FAMILY MEDICINE

## 2024-01-29 ASSESSMENT — ENCOUNTER SYMPTOMS
BLOOD IN STOOL: 0
ARTHRALGIAS: 0
ABDOMINAL PAIN: 0
COUGH: 0
HEMATURIA: 0
FATIGUE: 0
COLOR CHANGE: 0
RHINORRHEA: 0
FEVER: 0
NECK STIFFNESS: 0
AGITATION: 0
EYE DISCHARGE: 0
NAUSEA: 0
BRUISES/BLEEDS EASILY: 0
FACIAL SWELLING: 0
SORE THROAT: 0
OCCASIONAL FEELINGS OF UNSTEADINESS: 0
VOMITING: 0
EYE ITCHING: 0
SINUS PRESSURE: 0
SHORTNESS OF BREATH: 0
PALPITATIONS: 0
MYALGIAS: 0
ADENOPATHY: 0
FLANK PAIN: 0
CHEST TIGHTNESS: 0
BACK PAIN: 1
APPETITE CHANGE: 0
SINUS PAIN: 0
EYE PAIN: 0
FREQUENCY: 0
WOUND: 0
POLYDIPSIA: 0
CONSTIPATION: 0
DYSURIA: 0
VOICE CHANGE: 0
JOINT SWELLING: 0
WHEEZING: 0
DIARRHEA: 0
DIAPHORESIS: 0
POLYPHAGIA: 0
UNEXPECTED WEIGHT CHANGE: 0
NERVOUS/ANXIOUS: 0
TROUBLE SWALLOWING: 0
EYE REDNESS: 0
SLEEP DISTURBANCE: 0
ACTIVITY CHANGE: 0
DIZZINESS: 0
CHILLS: 0
LOSS OF SENSATION IN FEET: 0
DEPRESSION: 0

## 2024-01-29 ASSESSMENT — PATIENT HEALTH QUESTIONNAIRE - PHQ9
2. FEELING DOWN, DEPRESSED OR HOPELESS: NOT AT ALL
SUM OF ALL RESPONSES TO PHQ9 QUESTIONS 1 AND 2: 0
1. LITTLE INTEREST OR PLEASURE IN DOING THINGS: NOT AT ALL

## 2024-01-29 NOTE — PROGRESS NOTES
Subjective   Patient ID: Soto Rojas is a 53 y.o. male who presents for Follow-up (Back pain improved but went to left hip radiates to legs).  Today he is accompanied by alone.     [employer] is a 54 y.o. adult with history of sciatica who presents with lumbar back pain no injuries. Patient is not on any medications. Finished steroids and gabapentin and muscle relaxer. Is in PT.     - Symptoms began 6 months. Onset was gradual.   - Severity: mild   - Timing: constant   - Quality: dull   - Pain is exacerbated by certain movement movement. Pain radiates to lower extremities.   - Pain is not exacerbated by stretching helps and walking.   - Symptoms are associated with numbness.   - Symptoms are not associated with bowel/bladder incontinence, chills, fever, paresthesia, tingling, urinary symptoms, and weakness.   - Improved by warm compresses and stretching and PT.   - Not Improved by sleeping          Review of Systems   Constitutional:  Negative for activity change, appetite change, chills, diaphoresis, fatigue, fever and unexpected weight change.   HENT:  Negative for congestion, dental problem, drooling, ear discharge, ear pain, facial swelling, hearing loss, nosebleeds, postnasal drip, rhinorrhea, sinus pressure, sinus pain, sneezing, sore throat, tinnitus, trouble swallowing and voice change.    Eyes:  Negative for pain, discharge, redness, itching and visual disturbance.   Respiratory:  Negative for cough, chest tightness, shortness of breath and wheezing.    Cardiovascular:  Negative for chest pain, palpitations and leg swelling.   Gastrointestinal:  Negative for abdominal pain, blood in stool, constipation, diarrhea, nausea and vomiting.   Endocrine: Negative for cold intolerance, heat intolerance, polydipsia, polyphagia and polyuria.   Genitourinary:  Negative for decreased urine volume, dysuria, flank pain, frequency, hematuria and urgency.   Musculoskeletal:  Positive for back pain.  Negative for arthralgias, gait problem, joint swelling, myalgias and neck stiffness.   Skin:  Negative for color change, pallor, rash and wound.   Neurological:  Negative for dizziness.   Hematological:  Negative for adenopathy. Does not bruise/bleed easily.   Psychiatric/Behavioral:  Negative for agitation, behavioral problems and sleep disturbance. The patient is not nervous/anxious.        Objective   Blood Pressure 118/76   Pulse 70   Temperature 36.3 °C (97.4 °F)   Weight 94.8 kg (209 lb)   Oxygen Saturation 96%   Body Mass Index 26.83 kg/m²   BSA: 2.22 meters squared  Growth percentiles: Facility age limit for growth %amador is 20 years. Facility age limit for growth %amador is 20 years.     Physical Exam  Vitals and nursing note reviewed.   Constitutional:       General: He is not in acute distress.     Appearance: Normal appearance. He is normal weight. He is not ill-appearing.   HENT:      Head: Normocephalic.      Right Ear: Tympanic membrane, ear canal and external ear normal.      Left Ear: Tympanic membrane, ear canal and external ear normal.      Nose: Nose normal. No rhinorrhea.      Mouth/Throat:      Mouth: Mucous membranes are moist.      Pharynx: Oropharynx is clear.   Eyes:      Extraocular Movements: Extraocular movements intact.      Conjunctiva/sclera: Conjunctivae normal.      Pupils: Pupils are equal, round, and reactive to light.   Cardiovascular:      Rate and Rhythm: Normal rate and regular rhythm.      Pulses: Normal pulses.      Heart sounds: Normal heart sounds.   Pulmonary:      Effort: Pulmonary effort is normal. No respiratory distress.      Breath sounds: Normal breath sounds. No wheezing.   Musculoskeletal:         General: Normal range of motion.      Cervical back: Normal range of motion and neck supple. No rigidity or tenderness.      Right lower leg: No edema.      Left lower leg: No edema.   Lymphadenopathy:      Cervical: No cervical adenopathy.   Skin:     General: Skin is  warm and dry.   Neurological:      General: No focal deficit present.      Mental Status: He is alert and oriented to person, place, and time.      Sensory: No sensory deficit.      Motor: No weakness.      Coordination: Coordination normal.   Psychiatric:         Mood and Affect: Mood normal.         Behavior: Behavior normal.         Thought Content: Thought content normal.         Judgment: Judgment normal.         Assessment/Plan   Problem List Items Addressed This Visit             ICD-10-CM    Mixed hyperlipidemia E78.2     Stable on current medications  Continue meds and exercise.   ou have high cholesterol. (hyperlipidemia). This increases your risk for cardiovascular disease.(Heart attack/stroke/circulation);  Continue any prescribed and advised OTC medications, in addition, as reminder:;   For high LDL (>130) use blueberries 1 cup daily;   Plant Stanol dose 950mg twice daily (may need to find online source for this dose), and ;    Metamucil/psyllium fiber 7 grams daily.;  ;  For high Triglycerides:;   Fish oil (start at 2000-3000mg daily);  ;   You should exercise 30 minutes daily. ;  ;   Your nutrition plan needs to emphasizes vegetables, fruits, and lean meat. I recommend the Mediterranean Diet ;  Please find this link to helpful information about lowering your cholesterol: http://www.aafp.org/afp/2010/0501/p1103.html;           Essential (primary) hypertension I10     Stable on current medications  Continue meds and exercise.            Chronic left-sided lumbar radiculopathy - Primary M54.16     Stable on current medications  Continue meds and exercise.   Try pool therapy at home and continue PT         Pain of left lower extremity M79.605     Current Outpatient Medications   Medication Sig Dispense Refill    atorvastatin (Lipitor) 10 mg tablet Take 1 tablet (10 mg) by mouth once daily. 90 tablet 1    biotin 5 mg tablet Take 1 tablet (5 mg) by mouth once daily.      losartan (Cozaar) 50 mg tablet Take  1 tablet (50 mg) by mouth once daily. 90 tablet 1    multivitamin tablet Take 1 tablet by mouth once daily.       No current facility-administered medications for this visit.     F/U in 3 months for Pe and htn and low back    Dear Mr. Rojas:     To help you more effectively manage your high blood pressure, we would like to remind you of the following preventive measures you can take at home:    1) Eat right: Your diet should be rich in fruits, vegetables, potassium, and low-fat dairy products. You should also reduce your intake of sodium and fats, particularly saturated fats.    2) Maintain a healthy weight: Try to achieve and maintain a healthy weight. If you are unsure what this means for you, please contact our clinic.    3) Exercise: Try to get at least 30 minutes of aerobic exercise every day.    4) Moderate your alcohol consumption: Limit your alcohol intake to one drink per day.    5) Monitor your blood pressure: You should check your blood pressure regularly. Notify our clinic if your blood pressure readings are consistently higher than recommended.    We have included a personalized hypertension report card on the following page that lists your most recent blood pressure readings and lab results, along with your ideal values. If you have any questions or concerns about these instructions, your results, or your improving health, please don't hesitate to call.    Thank you for including us as members of your health care team.    [unfilled]    Sincerely,         Jocelyne Olivas MD    Enclosure      Hypertension Report Card for Soto Rojas  January 29, 2024:     Below is a summary of recent tests related to your hypertension that can help you manage your health.     Blood Pressure:   Normal blood pressure values are 120/80 or lower. Your blood pressure values should be less than 140/90. If your readings at home are consistently higher than this, please contact me.     Your most recent blood pressure  readings at our clinic were:   BP Readings from Last 3 Encounters:   01/29/24 118/76   11/27/23 138/80   08/23/23 122/76       Creatinine:   High blood pressure can cause a loss of kidney function. Creatinine is a measure of this kidney function.      Your most recent creatinine levels were:   Creatinine (mg/dL)   Date Value   11/21/2023 1.09   07/17/2023 1.04           Potassium:  Potassium is an electrolyte in your blood that can be affected by blood pressure treatment.     Your most recent potassium levels were:  Potassium (mmol/L)   Date Value   11/21/2023 4.4   07/17/2023 4.7

## 2024-01-29 NOTE — ASSESSMENT & PLAN NOTE
Stable on current medications  Continue meds and exercise.   Try pool therapy at home and continue PT

## 2024-01-31 ENCOUNTER — TREATMENT (OUTPATIENT)
Dept: PHYSICAL THERAPY | Facility: CLINIC | Age: 54
End: 2024-01-31
Payer: COMMERCIAL

## 2024-01-31 DIAGNOSIS — M54.50 LOW BACK PAIN, UNSPECIFIED BACK PAIN LATERALITY, UNSPECIFIED CHRONICITY, UNSPECIFIED WHETHER SCIATICA PRESENT: ICD-10-CM

## 2024-01-31 DIAGNOSIS — M79.605 PAIN OF LEFT LOWER EXTREMITY: ICD-10-CM

## 2024-01-31 PROCEDURE — 97110 THERAPEUTIC EXERCISES: CPT | Mod: GP,CQ

## 2024-01-31 ASSESSMENT — PAIN - FUNCTIONAL ASSESSMENT: PAIN_FUNCTIONAL_ASSESSMENT: 0-10

## 2024-01-31 ASSESSMENT — PAIN SCALES - GENERAL: PAINLEVEL_OUTOF10: 2

## 2024-01-31 NOTE — PROGRESS NOTES
Physical Therapy Treatment    Patient Name: Soto Rojas  MRN: [id number]  Today's Date: 1/31/2024  Time Calculation  Start Time: 0830  Stop Time: 0913  Time Calculation (min): 43 min    Current Problem:  Problem List Items Addressed This Visit             ICD-10-CM    Pain of left lower extremity M79.605     Other Visit Diagnoses         Codes    Low back pain, unspecified back pain laterality, unspecified chronicity, unspecified whether sciatica present     M54.50              Subjective   General:   Visit #: 3 of 120    Pt reports low back to L LE into shin persists but seems less frequent.   Continues to respond well to extension biased exercises throughout the day.     Pain:  Pain Assessment: 0-10  Pain Score: 2 (1.5/10)  Pain Location: Back  Pain Orientation: Left, Lower  Pain Radiating Towards:  (L shin)  Pain Frequency: Intermittent    Precautions:  Precautions  Precautions Comment: none      Objective   No objective measures taken this visit    Treatment:  Therapeutic exercise  Upright bike with good posture x5 min   GWEN x10, PRN  Seated HSS x 1 min, BL added to HEP  Seated piriformis stretch x1 min, BL added to HEP   PPU 2x10 , PRN  Prone hip ext 2 sec hold 2x10, BL added to HEP   BL SLR 2x10 added to HEP  S/L clamshell RTB 2x10 added to HEP  Bridge 5 sec 2x10   Rows GTB 2x10 increase resistance next visit   Pull downs GTB 2x10     Neuromuscular Re-education       Manual     Modalities      Assessment   Pt overall responding well to extension exercises with decreased N/T in LE.   Continued education on goal of centralization with exercises and importance of body mechanics throughout the day.   Tolerated exercises and progressions today without increased pain or radicular symptoms.   Updated HEP to include strengthening. Issued body mechanics handout as well.      Plan    Continue to progress POC as tolerated by patient to improve strength, mobility and overall function    Goals:  Active       PT Problem        Reduce pain at worst to 2-3/10 with all prior activity.        Start:  01/16/24    Expected End:  01/31/24            Pt to sit with good posture approx 50-75% of the time.        Start:  01/16/24    Expected End:  01/31/24            Reduce pain at worst to 0-1/10 with all prior activity.        Start:  01/16/24    Expected End:  02/27/24            Pt to increase LE strength to grossly 5/5 for improved gait, stairs, lifting and ADL's.        Start:  01/16/24    Expected End:  02/27/24            Increase lumbar flexion to 100%, ext to %, rotation bilat to 100% for self care, posture and ADL's.        Start:  01/16/24    Expected End:  02/27/24            Pt to have decrease in Oswestry by 10% to display increased overall function.        Start:  01/16/24    Expected End:  02/27/24            Pt to be independent with a HEP for self management.        Start:  01/16/24    Expected End:  02/27/24

## 2024-02-07 ENCOUNTER — TREATMENT (OUTPATIENT)
Dept: PHYSICAL THERAPY | Facility: CLINIC | Age: 54
End: 2024-02-07
Payer: COMMERCIAL

## 2024-02-07 DIAGNOSIS — M79.605 PAIN OF LEFT LOWER EXTREMITY: ICD-10-CM

## 2024-02-07 DIAGNOSIS — M54.50 LOW BACK PAIN, UNSPECIFIED BACK PAIN LATERALITY, UNSPECIFIED CHRONICITY, UNSPECIFIED WHETHER SCIATICA PRESENT: Primary | ICD-10-CM

## 2024-02-07 PROCEDURE — 97110 THERAPEUTIC EXERCISES: CPT | Mod: GP | Performed by: PHYSICAL THERAPIST

## 2024-02-07 ASSESSMENT — PAIN SCALES - GENERAL: PAINLEVEL_OUTOF10: 2

## 2024-02-07 ASSESSMENT — PAIN - FUNCTIONAL ASSESSMENT: PAIN_FUNCTIONAL_ASSESSMENT: 0-10

## 2024-02-07 NOTE — PROGRESS NOTES
Physical Therapy    Physical Therapy Treatment    Patient Name: Soto Rojas  MRN: [id number]  Today's Date: 2/7/2024  Time Calculation  Start Time: 0830  Stop Time: 0915  Time Calculation (min): 45 min  Payor: YAZMIN / Plan: YAZMIN  HEALTHCARE / Product Type: *No Product type* /     Reason for Referral: LBP  General Comment: visit 4 of 120    Current Problem    Problem List Items Addressed This Visit             ICD-10-CM    Low back pain - Primary M54.50    Pain of left lower extremity M79.605        Subjective   Pt notes he felt increased soreness after last session that lasted less than 24 hours. Pt reports symptoms have been less achy in low back since starting therapy and been sleeping with minimal to no familiar s/s.   Compliance with HEP- yes  Precautions  Precautions  Precautions Comment: none    Pain  Pain Assessment: 0-10  Pain Score: 2  Pain Location: Back  Pain Orientation: Left, Lower    Objective   No measures.     Treatments:  Therapeutic exercise - 40 min  Upright bike with good posture x5 min   GWEN 2x10, PRN  Seated HSS x 1 min, BL  Seated piriformis stretch x1 min, BL   PPU 2x10 , PRN  Prone hip ext alt 2 sec hold 2x10, BL  Bridge 5 sec 2x10   Wall slides 2 x 10   Rows BTB 2x10  Pull downs BTB 2x10   Anti rotation GTB 2x10  Rotation GTB 2x10  Sciatic nerve flossing in sitting 1x10 - inc shin discomfort    Manual - 5 min  Supine L sciatic nerve flossing    Assessment:   Pt challenged with increased resistance exercise reporting muscle soreness and with wall slide exercise. Pt responded well to lumbar extension exercise reporting reduction in symptoms and centralization of symptoms to L LE. Pt increased shin ache/pain with standing exercise, but reports not feeling discomfort in L hip or thigh. Pt experienced difficulty with seated sciatic nerve flossing with discomfort in L LE down to shin and regressed to supine PROM L sciatic nerve flossing.     Plan:   Plan to continue with [place] extension  based program, plan to re-assess centralization of L LE shin pain with unloaded extension and progress as tolerated.     Goals:  Active       PT Problem       Reduce pain at worst to 2-3/10 with all prior activity.  (Met)       Start:  01/16/24    Expected End:  01/31/24    Resolved:  02/07/24         Pt to sit with good posture approx 50-75% of the time.  (Met)       Start:  01/16/24    Expected End:  01/31/24    Resolved:  02/07/24         Reduce pain at worst to 0-1/10 with all prior activity.  (Progressing)       Start:  01/16/24    Expected End:  02/27/24            Pt to increase LE strength to grossly 5/5 for improved gait, stairs, lifting and ADL's.  (Progressing)       Start:  01/16/24    Expected End:  02/27/24            Increase lumbar flexion to 100%, ext to %, rotation bilat to 100% for self care, posture and ADL's.  (Progressing)       Start:  01/16/24    Expected End:  02/27/24            Pt to have decrease in Oswestry by 10% to display increased overall function.  (Progressing)       Start:  01/16/24    Expected End:  02/27/24            Pt to be independent with a HEP for self management.  (Progressing)       Start:  01/16/24    Expected End:  02/27/24             Note signed by Gifty Webb, SPT

## 2024-02-14 ENCOUNTER — TREATMENT (OUTPATIENT)
Dept: PHYSICAL THERAPY | Facility: CLINIC | Age: 54
End: 2024-02-14
Payer: COMMERCIAL

## 2024-02-14 DIAGNOSIS — M79.605 PAIN OF LEFT LOWER EXTREMITY: ICD-10-CM

## 2024-02-14 DIAGNOSIS — M54.50 LOW BACK PAIN, UNSPECIFIED BACK PAIN LATERALITY, UNSPECIFIED CHRONICITY, UNSPECIFIED WHETHER SCIATICA PRESENT: ICD-10-CM

## 2024-02-14 PROCEDURE — 97110 THERAPEUTIC EXERCISES: CPT | Mod: GP,CQ

## 2024-02-14 ASSESSMENT — PAIN - FUNCTIONAL ASSESSMENT: PAIN_FUNCTIONAL_ASSESSMENT: 0-10

## 2024-02-14 ASSESSMENT — PAIN SCALES - GENERAL: PAINLEVEL_OUTOF10: 2

## 2024-02-14 NOTE — PROGRESS NOTES
Physical Therapy    Physical Therapy Treatment    Patient Name: Soto Rojas  MRN: [id number]  Today's Date: 2/14/2024  Time Calculation  Start Time: 0830  Stop Time: 0915  Time Calculation (min): 45 min  Payor: YAZMIN / Plan: YAZMIN  HEALTHCARE / Product Type: *No Product type* /     General Comment: visit 5 of 120    Current Problem    Problem List Items Addressed This Visit             ICD-10-CM    Low back pain M54.50    Pain of left lower extremity M79.605        Subjective   Pt reports LBP persists. Less N/T in L lower LE overall but still experiencing symptoms daily and each morning.  Responds well to extension exercises with reduced/centralized symptoms.   Compliant with HEP.     Precautions  Precautions  Precautions Comment: none    Pain  Pain Assessment: 0-10  Pain Score: 2  Pain Location: Back  Pain Orientation: Left, Lower    Objective   No measures.     Treatments:  Therapeutic exercise - 40 min  Upright bike with good posture x5 min   GWEN 2x10, PRN - NE today  PPU 2x10 , PRN - decreased N/T in L LE   Seated HSS x 1 min, BL  Seated piriformis stretch x1 min, BL   Seated lean backs 2x10   Bridge 5 sec 2x10   Wall slides 2 x 10   Rows BTB 2x10  Pull downs BTB 2x10   Anti rotation BTB 2x10  Rotation GTB 2x10 - held   Inchworm 20 ft x 2, DLS with upright  posture  Monster walk 20 ft x2, DLS with upright posture   Prone OA/OL x10     Sciatic nerve flossing in sitting 1x10 - inc shin discomfort (held today)    Manual -   Supine peroneal nerve glides 2x30   Manual piriformis stretch 2x30 sec, contract relax     Assessment:   Pt responds well to prone extension with decreased in N/T in L LE following each set.   NE on radicular symptoms with GWEN. Discussed and rec'd to perform increased sets 2x10 PPU/GWEN with goal of centralization.  Tolerated strengthening progression well and voiced diminishing shin pain as tx went on.     Plan:   Plan to continue with [place] extension based program, plan to re-assess  centralization of L LE shin pain with unloaded extension and progress as tolerated.   Trial manual lumbar traction next session     Goals:  Active       PT Problem       Reduce pain at worst to 2-3/10 with all prior activity.  (Met)       Start:  01/16/24    Expected End:  01/31/24    Resolved:  02/07/24         Pt to sit with good posture approx 50-75% of the time.  (Met)       Start:  01/16/24    Expected End:  01/31/24    Resolved:  02/07/24         Reduce pain at worst to 0-1/10 with all prior activity.  (Progressing)       Start:  01/16/24    Expected End:  02/27/24            Pt to increase LE strength to grossly 5/5 for improved gait, stairs, lifting and ADL's.  (Progressing)       Start:  01/16/24    Expected End:  02/27/24            Increase lumbar flexion to 100%, ext to %, rotation bilat to 100% for self care, posture and ADL's.  (Progressing)       Start:  01/16/24    Expected End:  02/27/24            Pt to have decrease in Oswestry by 10% to display increased overall function.  (Progressing)       Start:  01/16/24    Expected End:  02/27/24            Pt to be independent with a HEP for self management.  (Progressing)       Start:  01/16/24    Expected End:  02/27/24

## 2024-02-21 ENCOUNTER — TREATMENT (OUTPATIENT)
Dept: PHYSICAL THERAPY | Facility: CLINIC | Age: 54
End: 2024-02-21
Payer: COMMERCIAL

## 2024-02-21 DIAGNOSIS — M54.50 LOW BACK PAIN, UNSPECIFIED BACK PAIN LATERALITY, UNSPECIFIED CHRONICITY, UNSPECIFIED WHETHER SCIATICA PRESENT: ICD-10-CM

## 2024-02-21 DIAGNOSIS — M79.605 PAIN OF LEFT LOWER EXTREMITY: ICD-10-CM

## 2024-02-21 PROCEDURE — 97110 THERAPEUTIC EXERCISES: CPT | Mod: GP,CQ

## 2024-02-21 PROCEDURE — 97140 MANUAL THERAPY 1/> REGIONS: CPT | Mod: GP,CQ

## 2024-02-21 ASSESSMENT — PAIN DESCRIPTION - DESCRIPTORS: DESCRIPTORS: TINGLING;PINS AND NEEDLES

## 2024-02-21 ASSESSMENT — PAIN - FUNCTIONAL ASSESSMENT: PAIN_FUNCTIONAL_ASSESSMENT: 0-10

## 2024-02-21 NOTE — PROGRESS NOTES
Physical Therapy    Physical Therapy Treatment    Patient Name: Soto Rojas  MRN: [id number]  Today's Date: 2/21/2024  Time Calculation  Start Time: 0830  Stop Time: 0920  Time Calculation (min): 50 min  Payor: YAZMIN / Plan: YAZMIN  HEALTHCARE / Product Type: *No Product type* /     General Comment: visit 6 of 120    Current Problem    Problem List Items Addressed This Visit             ICD-10-CM    Low back pain M54.50    Pain of left lower extremity M79.605       Subjective   Pt reports radicular pain persists and was a little aggravated from doing laundry and dishes over the weekend.   Still experiencing radicular symptoms in L LE daily. Extension exercises do help and that he has increased sets per recommendation last session. Other than some increased UE mm soreness from PPU, he feels like his leg is feeling better when he does.   SLR in HEP brings on radicular symptoms  Precautions  Precautions  Precautions Comment: none    Pain  Pain Assessment: 0-10  Pain Location: Back ((hip))  Pain Orientation: Left, Lower  Pain Radiating Towards: L shin  Pain Descriptors: Tingling, Pins and needles    Objective   No measures.     Treatments:  Therapeutic exercise -   Upright bike with good posture x5 min   GWEN 2x10, PRN - NE today  PPU 2x10 , PRN - decreased N/T in L LE   Seated HSS x 1 min, BL  Seated piriformis stretch x1 min, BL   Seated lean backs 2x10   Bridge 5 sec 2x10   Wall slides 2 x 10   Rows BTB 2x10  Pull downs BTB 2x10   Anti rotation BTB 2x10  Rotation GTB 2x10 - held   Inchworm 20 ft x 2, DLS with upright  posture RTB  Monster walk 20 ft x2, DLS with upright posture  RTB  Prone OA/OL x10         Manual -   Lumbar traction- L LE symptoms centralized   Piriformis release     Informed consent given on manual treatment. Pt given opportunity to cease treatment at any time. Educated pt on expected soreness, possible ecchymosis. Pt voiced understanding  No adverse effects were noted post tx.       Modalities:  CP to L glute/low back x5 min  Skin intact    Assessment:   Pt responds well to prone extension with decreased in N/T in L LE however symptoms still persist daily.   Pt to cont with performing 2 sets with goal of centralization as long as responds favorably.   L LE symptoms centralized with manual lumbar traction.   Trialed piriformis release in hopes to decrease hip/radicular pain. Pt responded well, voicing relief and decreased pain following. CP for residual soreness.   Discussed self release with tennis ball.     Plan:   Monitor response to manual lumbar traction and piriformis release next session     Recheck next visit     HEP:  Access Code: [email or web address]    Goals:  Active       PT Problem       Reduce pain at worst to 2-3/10 with all prior activity.  (Met)       Start:  01/16/24    Expected End:  01/31/24    Resolved:  02/07/24         Pt to sit with good posture approx 50-75% of the time.  (Met)       Start:  01/16/24    Expected End:  01/31/24    Resolved:  02/07/24         Reduce pain at worst to 0-1/10 with all prior activity.  (Progressing)       Start:  01/16/24    Expected End:  02/27/24            Pt to increase LE strength to grossly 5/5 for improved gait, stairs, lifting and ADL's.  (Progressing)       Start:  01/16/24    Expected End:  02/27/24            Increase lumbar flexion to 100%, ext to %, rotation bilat to 100% for self care, posture and ADL's.  (Progressing)       Start:  01/16/24    Expected End:  02/27/24            Pt to have decrease in Oswestry by 10% to display increased overall function.  (Progressing)       Start:  01/16/24    Expected End:  02/27/24            Pt to be independent with a HEP for self management.  (Progressing)       Start:  01/16/24    Expected End:  02/27/24

## 2024-02-29 ENCOUNTER — TREATMENT (OUTPATIENT)
Dept: PHYSICAL THERAPY | Facility: CLINIC | Age: 54
End: 2024-02-29
Payer: COMMERCIAL

## 2024-02-29 DIAGNOSIS — M79.605 PAIN OF LEFT LOWER EXTREMITY: ICD-10-CM

## 2024-02-29 DIAGNOSIS — M54.50 LOW BACK PAIN, UNSPECIFIED BACK PAIN LATERALITY, UNSPECIFIED CHRONICITY, UNSPECIFIED WHETHER SCIATICA PRESENT: Primary | ICD-10-CM

## 2024-02-29 PROCEDURE — 97110 THERAPEUTIC EXERCISES: CPT | Mod: GP | Performed by: PHYSICAL THERAPIST

## 2024-02-29 ASSESSMENT — PAIN SCALES - GENERAL: PAINLEVEL_OUTOF10: 2

## 2024-02-29 ASSESSMENT — PAIN - FUNCTIONAL ASSESSMENT: PAIN_FUNCTIONAL_ASSESSMENT: 0-10

## 2024-02-29 ASSESSMENT — PAIN DESCRIPTION - DESCRIPTORS: DESCRIPTORS: ACHING

## 2024-02-29 NOTE — PROGRESS NOTES
Physical Therapy    Physical Therapy Treatment/Discharge    Patient Name: Soto Rojas  MRN: [id number]  Today's Date: 2/29/2024  Time Calculation  Start Time: 1000  Stop Time: 1045  Time Calculation (min): 45 min  PT Therapeutic Procedures Time Entry  Therapeutic Exercise Time Entry: 45  Payor: YAZMIN / Plan: YAZMIN  HEALTHCARE / Product Type: *No Product type* /     General Comment: visit 7 of 120    Current Problem    Problem List Items Addressed This Visit             ICD-10-CM    Low back pain - Primary M54.50    Pain of left lower extremity M79.605        Subjective   Pt notes LBP with L LE keeps coming back but does seem to centralize with extension based exercises. Pt notes lumbar traction helped to alleviate L shin pain to just be a dull ache. Pt otherwise notes not much change overall with pain. Pt notes bending forward continues to worsen pain down L LE towards shin to be 4-5/10 pain at worst.   Compliance with HEP-yes  Precautions  Precautions  Precautions Comment: none    Pain  Pain Assessment: 0-10  Pain Score: 2  Pain Location: Back  Pain Orientation: Left, Lower  Pain Radiating Towards: L shin  Pain Descriptors: Aching      Objective   Lower Extremity ROM: WNL unless documented below:    Lower Extremity Strength:  Date: eval Myotome RIGHT LEFT   Hip Flexion L1,2 4+ 4   Hip ext  5 5   Hip abd  5 5   Hip add  5 5   Knee Extension L3 5 4+   Knee Flexion  5 4+   Ankle DF L4  4+   Ankle PF S1 5 5     Lumbar ROM:  Date: eval Percentage    Flexion 50% Pain    Extension 75%     RIGHT LEFT   Side Bend WNL WNL   Rotation WNL WNL     Special tests:  Lumbar: (+) Slump L LE  Noy Repeated Movements: baseline pain 2/10 L low back and L lateral shin  RFIS 10 reps, produced, worse in 1/10 in L hip, 2/10 L shin   GWEN 10 reps, produced, NB/NW  REIL 10 reps- reduces slightly in L hip NC in L shin 2/10  REIL with self overpressure 10 reps better but still pain L shin 1/10    Other Measures  Oswestry Disablity  Index (ESTELA): 8%     Treatments:  Therapeutic exercise   Re-check x 10 min  Upright bike with good posture x5 min   GWEN 2x10, PRN - NE today  PPU 2x10 , PRN - decreased N/T in L LE   HSS x 1 min, BL  Seated piriformis stretch x1 min, BL   Seated lean backs 2x10   Bridge 5 sec 2x10   Rows BTB 2x10  Pull downs BTB 2x10   Anti rotation BTB 2x10  Rotation GTB 2x10     Held d/t time  Wall slides 2 x 10   Inchworm 20 ft x 2, DLS with upright  posture RTB  Monster walk 20 ft x2, DLS with upright posture  RTB  Prone OA/OL x10     Manual - held  Lumbar traction- L LE symptoms centralized   Piriformis release     Informed consent given on manual treatment. Pt given opportunity to cease treatment at any time. Educated pt on expected soreness, possible ecchymosis. Pt voiced understanding  No adverse effects were noted post tx.      Modalities: held  CP to L glute/low back  Skin intact    Updated HEP:   Access Code: [email or web address]  URL: [email or web address]  Date: 02/29/2024  Prepared by: CHAIM [place]    Exercises  - Lying Prone  - 2 x daily - 7 x weekly - 1 reps - 5 min  hold  - Static Prone on Elbows  - 2 x daily - 7 x weekly - 1 reps - 1 minute hold  - Prone Press Up  - 6 x daily - 7 x weekly - 1 sets - 10 reps  - Standing Lumbar Extension  - 6 x daily - 7 x weekly - 1 sets - 10 reps  - Seated Hamstring Stretch  - 1 x daily - 7 x weekly - 1-2 sets - 10 reps - 30 sec-1 min hold  - Seated Piriformis Stretch  - 1 x daily - 7 x weekly - 1-2 sets - 10 reps - 30 sec-1 min hold  - Prone Hip Extension  - 1 x daily - 3-4 x weekly - 2-3 sets - 10 reps - 2 sec hold  - Clamshell with Resistance  - 1 x daily - 3-4 x weekly - 2-3 sets - 10 reps - 2 sec hold  - Supine Active Straight Leg Raise  - 1 x daily - 3-4 x weekly - 2-3 sets - 10 reps - 2 sec hold  - Wall Slide with Posterior Pelvic Tilt  - 1 x daily - 3-4 x weekly - 2 sets - 10 reps  - Supine Bridge  - 1 x daily - 3-4 x weekly - 2 sets - 10 reps - 3-4 second   hold  - Standing Shoulder Row with Anchored Resistance  - 1 x daily - 3-4 x weekly - 2 sets - 10 reps  - Shoulder Extension with Resistance  - 1 x daily - 3-4 x weekly - 2 sets - 10 reps    Assessment:   Pt presents with increased lumbar extension ROM, slight improvements in strength, posture and function. Pt pain continues to persist with L radicular symptoms towards the L shin. Pt seems to respond well with extension based exercises with reports of centralization of pain, but pain continues to return after each session. Discussed with pt to hold therapy for now due to no progress with s/s, continue with updated HEP for self-management, and refer back to physician for persistent L radicular symptoms.    Plan:   Discharge pt to refer back to physician due to pt s/s consistently returning. Pt discharged with home exercises to keep up with to continue to manage LBP with L radicular symptoms into the shin.     Goals:  Active       PT Problem       Reduce pain at worst to 2-3/10 with all prior activity.  (Met)       Start:  01/16/24    Expected End:  01/31/24    Resolved:  02/07/24         Pt to sit with good posture approx 50-75% of the time.  (Met)       Start:  01/16/24    Expected End:  01/31/24    Resolved:  02/07/24         Reduce pain at worst to 0-1/10 with all prior activity.  (Progressing)       Start:  01/16/24    Expected End:  02/27/24       Progressed not met - 25% met         Pt to increase LE strength to grossly 5/5 for improved gait, stairs, lifting and ADL's.  (Progressing)       Start:  01/16/24    Expected End:  02/27/24       Progressed not met- 50% met         Increase lumbar flexion to 100%, ext to %, rotation bilat to 100% for self care, posture and ADL's.  (Progressing)       Start:  01/16/24    Expected End:  02/27/24       Progressed not met - 80% met         Pt to have decrease in Oswestry by 10% to display increased overall function.  (Progressing)       Start:  01/16/24    Expected  End:  02/27/24       Met (2/29/24)         Pt to be independent with a HEP for self management.  (Progressing)       Start:  01/16/24    Expected End:  02/27/24       Met (2/29/24)          Note signed by DIANE Echols.

## 2024-03-11 DIAGNOSIS — I10 HTN (HYPERTENSION), BENIGN: ICD-10-CM

## 2024-03-11 RX ORDER — LOSARTAN POTASSIUM 50 MG/1
50 TABLET ORAL DAILY
Qty: 90 TABLET | Refills: 1 | Status: SHIPPED | OUTPATIENT
Start: 2024-03-11

## 2024-03-11 NOTE — TELEPHONE ENCOUNTER
Fax request from [facility] requesting a refill on:   losartan (Cozaar) 50 mg tablet  Taken Once Daily  Quantity: 90

## 2024-05-29 ENCOUNTER — LAB (OUTPATIENT)
Dept: LAB | Facility: LAB | Age: 54
End: 2024-05-29
Payer: COMMERCIAL

## 2024-05-29 ENCOUNTER — OFFICE VISIT (OUTPATIENT)
Dept: PRIMARY CARE | Facility: CLINIC | Age: 54
End: 2024-05-29
Payer: COMMERCIAL

## 2024-05-29 VITALS
BODY MASS INDEX: 27.03 KG/M2 | SYSTOLIC BLOOD PRESSURE: 128 MMHG | HEART RATE: 62 BPM | WEIGHT: 210.5 LBS | DIASTOLIC BLOOD PRESSURE: 82 MMHG | OXYGEN SATURATION: 96 %

## 2024-05-29 DIAGNOSIS — I10 ESSENTIAL (PRIMARY) HYPERTENSION: Primary | ICD-10-CM

## 2024-05-29 DIAGNOSIS — E78.2 MIXED HYPERLIPIDEMIA: ICD-10-CM

## 2024-05-29 DIAGNOSIS — I10 ESSENTIAL (PRIMARY) HYPERTENSION: ICD-10-CM

## 2024-05-29 DIAGNOSIS — M54.16 CHRONIC LEFT-SIDED LUMBAR RADICULOPATHY: ICD-10-CM

## 2024-05-29 DIAGNOSIS — H61.23 BILATERAL IMPACTED CERUMEN: ICD-10-CM

## 2024-05-29 LAB
ALBUMIN SERPL BCP-MCNC: 4.8 G/DL (ref 3.4–5)
ALP SERPL-CCNC: 62 U/L (ref 33–120)
ALT SERPL W P-5'-P-CCNC: 34 U/L (ref 10–52)
ANION GAP SERPL CALC-SCNC: 13 MMOL/L (ref 10–20)
AST SERPL W P-5'-P-CCNC: 29 U/L (ref 9–39)
BASOPHILS # BLD AUTO: 0.03 X10*3/UL (ref 0–0.1)
BASOPHILS NFR BLD AUTO: 0.5 %
BILIRUB SERPL-MCNC: 0.8 MG/DL (ref 0–1.2)
BUN SERPL-MCNC: 11 MG/DL (ref 6–23)
CALCIUM SERPL-MCNC: 9.6 MG/DL (ref 8.6–10.6)
CHLORIDE SERPL-SCNC: 105 MMOL/L (ref 98–107)
CHOLEST SERPL-MCNC: 155 MG/DL (ref 0–199)
CHOLESTEROL/HDL RATIO: 3.2
CO2 SERPL-SCNC: 28 MMOL/L (ref 21–32)
CREAT SERPL-MCNC: 0.99 MG/DL (ref 0.5–1.3)
EGFRCR SERPLBLD CKD-EPI 2021: >90 ML/MIN/1.73M*2
EOSINOPHIL # BLD AUTO: 0.09 X10*3/UL (ref 0–0.7)
EOSINOPHIL NFR BLD AUTO: 1.5 %
ERYTHROCYTE [DISTWIDTH] IN BLOOD BY AUTOMATED COUNT: 11.8 % (ref 11.5–14.5)
GLUCOSE SERPL-MCNC: 89 MG/DL (ref 74–99)
HCT VFR BLD AUTO: 43.5 % (ref 41–52)
HDLC SERPL-MCNC: 49.1 MG/DL
HGB BLD-MCNC: 14.6 G/DL (ref 13.5–17.5)
IMM GRANULOCYTES # BLD AUTO: 0.01 X10*3/UL (ref 0–0.7)
IMM GRANULOCYTES NFR BLD AUTO: 0.2 % (ref 0–0.9)
LDLC SERPL CALC-MCNC: 86 MG/DL
LYMPHOCYTES # BLD AUTO: 2.63 X10*3/UL (ref 1.2–4.8)
LYMPHOCYTES NFR BLD AUTO: 44.7 %
MCH RBC QN AUTO: 30.5 PG (ref 26–34)
MCHC RBC AUTO-ENTMCNC: 33.6 G/DL (ref 32–36)
MCV RBC AUTO: 91 FL (ref 80–100)
MONOCYTES # BLD AUTO: 0.44 X10*3/UL (ref 0.1–1)
MONOCYTES NFR BLD AUTO: 7.5 %
NEUTROPHILS # BLD AUTO: 2.68 X10*3/UL (ref 1.2–7.7)
NEUTROPHILS NFR BLD AUTO: 45.6 %
NON HDL CHOLESTEROL: 106 MG/DL (ref 0–149)
NRBC BLD-RTO: 0 /100 WBCS (ref 0–0)
PLATELET # BLD AUTO: 286 X10*3/UL (ref 150–450)
POTASSIUM SERPL-SCNC: 4.2 MMOL/L (ref 3.5–5.3)
PROT SERPL-MCNC: 7.2 G/DL (ref 6.4–8.2)
RBC # BLD AUTO: 4.79 X10*6/UL (ref 4.5–5.9)
SODIUM SERPL-SCNC: 142 MMOL/L (ref 136–145)
TRIGL SERPL-MCNC: 102 MG/DL (ref 0–149)
VLDL: 20 MG/DL (ref 0–40)
WBC # BLD AUTO: 5.9 X10*3/UL (ref 4.4–11.3)

## 2024-05-29 PROCEDURE — 69209 REMOVE IMPACTED EAR WAX UNI: CPT | Performed by: FAMILY MEDICINE

## 2024-05-29 PROCEDURE — 80053 COMPREHEN METABOLIC PANEL: CPT

## 2024-05-29 PROCEDURE — 99214 OFFICE O/P EST MOD 30 MIN: CPT | Performed by: FAMILY MEDICINE

## 2024-05-29 PROCEDURE — 80061 LIPID PANEL: CPT

## 2024-05-29 PROCEDURE — 3074F SYST BP LT 130 MM HG: CPT | Performed by: FAMILY MEDICINE

## 2024-05-29 PROCEDURE — 3079F DIAST BP 80-89 MM HG: CPT | Performed by: FAMILY MEDICINE

## 2024-05-29 PROCEDURE — 85025 COMPLETE CBC W/AUTO DIFF WBC: CPT

## 2024-05-29 PROCEDURE — 36415 COLL VENOUS BLD VENIPUNCTURE: CPT

## 2024-05-29 ASSESSMENT — ENCOUNTER SYMPTOMS
SWEATS: 0
BLURRED VISION: 0
SHORTNESS OF BREATH: 0
PALPITATIONS: 0
NECK PAIN: 0
ORTHOPNEA: 0
PND: 0
HYPERTENSION: 1
HEADACHES: 0

## 2024-05-29 NOTE — PROGRESS NOTES
Subjective   Patient ID: Soto Rojas is a 53 y.o. male who presents for Follow-up.  Today he is accompanied by alone.     Hypertension  This is a chronic problem. The current episode started more than 1 month ago. The problem is unchanged. The problem is controlled. Pertinent negatives include no anxiety, blurred vision, chest pain, headaches, malaise/fatigue, neck pain, orthopnea, palpitations, peripheral edema, PND, shortness of breath or sweats. There are no associated agents to hypertension. Risk factors for coronary artery disease include dyslipidemia. There are no compliance problems.        Review of Systems   Constitutional:  Negative for malaise/fatigue.   Eyes:  Negative for blurred vision.   Respiratory:  Negative for shortness of breath.    Cardiovascular:  Negative for chest pain, palpitations, orthopnea and PND.   Musculoskeletal:  Negative for neck pain.   Neurological:  Negative for headaches.       Objective   /82   Pulse 62   Wt 95.5 kg (210 lb 8 oz)   SpO2 96%   BMI 27.03 kg/m²   BSA: 2.23 meters squared  Growth percentiles: Facility age limit for growth %amador is 20 years. Facility age limit for growth %amador is 20 years.     Physical Exam  Vitals and nursing note reviewed.   Constitutional:       General: He is not in acute distress.     Appearance: Normal appearance. He is normal weight. He is not ill-appearing.   HENT:      Head: Normocephalic.      Right Ear: Tympanic membrane, ear canal and external ear normal. There is impacted cerumen.      Left Ear: Tympanic membrane, ear canal and external ear normal. There is impacted cerumen.      Nose: Nose normal. No rhinorrhea.      Mouth/Throat:      Mouth: Mucous membranes are moist.      Pharynx: Oropharynx is clear.   Eyes:      Extraocular Movements: Extraocular movements intact.      Conjunctiva/sclera: Conjunctivae normal.      Pupils: Pupils are equal, round, and reactive to light.   Cardiovascular:      Rate and Rhythm: Normal  rate and regular rhythm.      Pulses: Normal pulses.      Heart sounds: Normal heart sounds.   Pulmonary:      Effort: Pulmonary effort is normal. No respiratory distress.      Breath sounds: Normal breath sounds. No wheezing.   Musculoskeletal:         General: Normal range of motion.      Cervical back: Normal range of motion and neck supple. No rigidity or tenderness.      Right lower leg: No edema.      Left lower leg: No edema.   Lymphadenopathy:      Cervical: No cervical adenopathy.   Skin:     General: Skin is warm and dry.   Neurological:      General: No focal deficit present.      Mental Status: He is alert and oriented to person, place, and time.      Sensory: No sensory deficit.      Motor: No weakness.      Coordination: Coordination normal.   Psychiatric:         Mood and Affect: Mood normal.         Behavior: Behavior normal.         Thought Content: Thought content normal.         Judgment: Judgment normal.     Patient ID: Soto Rojas is a 53 y.o. male.    Ear Cerumen Removal    Date/Time: 5/29/2024 9:20 AM    Performed by: Jocelyne Olivas MD  Authorized by: Jocelyne Olivas MD    Consent:     Consent obtained:  Verbal    Consent given by:  Patient    Risks, benefits, and alternatives were discussed: yes      Risks discussed:  Bleeding, infection, pain, TM perforation, incomplete removal and dizziness    Alternatives discussed:  No treatment, delayed treatment, alternative treatment, observation and referral  Universal protocol:     Procedure explained and questions answered to patient or proxy's satisfaction: yes      Patient identity confirmed:  Verbally with patient  Procedure details:     Location:  L ear and R ear    Procedure type: irrigation      Procedure outcomes: cerumen removed    Post-procedure details:     Inspection:  TM intact and ear canal clear    Hearing quality:  Normal    Procedure completion:  Tolerated well, no immediate complications      Assessment/Plan   Problem List Items  Addressed This Visit             ICD-10-CM    Mixed hyperlipidemia E78.2    Relevant Orders    CBC and Auto Differential    Comprehensive Metabolic Panel    Lipid Panel    Essential (primary) hypertension - Primary I10    Relevant Orders    CBC and Auto Differential    Comprehensive Metabolic Panel    Lipid Panel    Chronic left-sided lumbar radiculopathy M54.16    Bilateral impacted cerumen H61.23    Relevant Orders    Ear Cerumen Removal     Current Outpatient Medications   Medication Sig Dispense Refill    atorvastatin (Lipitor) 10 mg tablet Take 1 tablet (10 mg) by mouth once daily. 90 tablet 1    biotin 5 mg tablet Take 1 tablet (5 mg) by mouth once daily.      losartan (Cozaar) 50 mg tablet Take 1 tablet (50 mg) by mouth once daily. 90 tablet 1    multivitamin tablet Take 1 tablet by mouth once daily.       No current facility-administered medications for this visit.       Fasting labs  Referral to pain management for possible epidural injections  Ear irrigation today  Call if any new concerns  Or if leg gets worse  F/U in 3 months for a PE

## 2024-06-03 ENCOUNTER — OFFICE VISIT (OUTPATIENT)
Dept: DERMATOLOGY | Facility: CLINIC | Age: 54
End: 2024-06-03
Payer: COMMERCIAL

## 2024-06-03 DIAGNOSIS — L81.4 LENTIGO: ICD-10-CM

## 2024-06-03 DIAGNOSIS — L82.1 SEBORRHEIC KERATOSIS: ICD-10-CM

## 2024-06-03 DIAGNOSIS — L30.4 INTERTRIGO: Primary | ICD-10-CM

## 2024-06-03 DIAGNOSIS — D18.01 HEMANGIOMA OF SKIN: ICD-10-CM

## 2024-06-03 DIAGNOSIS — D22.9 MELANOCYTIC NEVUS, UNSPECIFIED LOCATION: ICD-10-CM

## 2024-06-03 DIAGNOSIS — Z12.83 ENCOUNTER FOR SCREENING FOR MALIGNANT NEOPLASM OF SKIN: ICD-10-CM

## 2024-06-03 PROCEDURE — 1036F TOBACCO NON-USER: CPT | Performed by: NURSE PRACTITIONER

## 2024-06-03 PROCEDURE — 99213 OFFICE O/P EST LOW 20 MIN: CPT | Performed by: NURSE PRACTITIONER

## 2024-06-03 RX ORDER — HYDROCORTISONE 25 MG/G
CREAM TOPICAL
Qty: 30 G | Refills: 2 | Status: SHIPPED | OUTPATIENT
Start: 2024-06-03

## 2024-06-03 ASSESSMENT — DERMATOLOGY QUALITY OF LIFE (QOL) ASSESSMENT
WHAT SINGLE SKIN CONDITION LISTED BELOW IS THE PATIENT ANSWERING THE QUALITY-OF-LIFE ASSESSMENT QUESTIONS ABOUT: NONE OF THE ABOVE
RATE HOW BOTHERED YOU ARE BY EFFECTS OF YOUR SKIN PROBLEMS ON YOUR ACTIVITIES (EG, GOING OUT, ACCOMPLISHING WHAT YOU WANT, WORK ACTIVITIES OR YOUR RELATIONSHIPS WITH OTHERS): 0 - NEVER BOTHERED
RATE HOW BOTHERED YOU ARE BY EFFECTS OF YOUR SKIN PROBLEMS ON YOUR ACTIVITIES (EG, GOING OUT, ACCOMPLISHING WHAT YOU WANT, WORK ACTIVITIES OR YOUR RELATIONSHIPS WITH OTHERS): 0 - NEVER BOTHERED
RATE HOW BOTHERED YOU ARE BY SYMPTOMS OF YOUR SKIN PROBLEM (EG, ITCHING, STINGING BURNING, HURTING OR SKIN IRRITATION): 2
WHAT SINGLE SKIN CONDITION LISTED BELOW IS THE PATIENT ANSWERING THE QUALITY-OF-LIFE ASSESSMENT QUESTIONS ABOUT: NONE OF THE ABOVE
RATE HOW BOTHERED YOU ARE BY SYMPTOMS OF YOUR SKIN PROBLEM (EG, ITCHING, STINGING BURNING, HURTING OR SKIN IRRITATION): 2
DATE THE QUALITY-OF-LIFE ASSESSMENT WAS COMPLETED: 66994
RATE HOW EMOTIONALLY BOTHERED YOU ARE BY YOUR SKIN PROBLEM (FOR EXAMPLE, WORRY, EMBARRASSMENT, FRUSTRATION): 0 - NEVER BOTHERED
RATE HOW EMOTIONALLY BOTHERED YOU ARE BY YOUR SKIN PROBLEM (FOR EXAMPLE, WORRY, EMBARRASSMENT, FRUSTRATION): 0 - NEVER BOTHERED

## 2024-06-03 ASSESSMENT — PATIENT GLOBAL ASSESSMENT (PGA): WHAT IS THE PGA: PATIENT GLOBAL ASSESSMENT:  2 - MILD

## 2024-06-03 NOTE — PROGRESS NOTES
Subjective     Soto Rojas is a 53 y.o. male who presents for the following: Skin Check (Presents for FBSE. Pt reports personal hx of AK and family hx of NMSC (father/grandfather). Denies pain, itching, or bleeding. ).     Review of Systems:  No other skin or systemic complaints other than what is documented elsewhere in the note.    The following portions of the chart were reviewed this encounter and updated as appropriate:   Tobacco  Allergies  Meds  Problems  Med Hx  Surg Hx  Fam Hx         Skin Cancer History  No skin cancer on file.      Specialty Problems    None       Objective   Well appearing patient in no apparent distress; mood and affect are within normal limits.    A focused skin examination was performed. All findings within normal limits unless otherwise noted below.    Assessment/Plan   1. Intertrigo  Pelvis - Anterior  Erythema of skin folds, no satellite papules    - Intertrigo is a rash that results from irritation of the skin in body folds (eg, armpits, under the breasts, belly, buttocks, groin, and sometimes between fingers or toes). Intertrigo can be worsened by any conditions causing increased heat, wetness, and friction. Areas affected by intertrigo may also become infected with yeast or bacteria.  - The goals of treatment are aimed at eliminating or reducing friction, moisture, and heat in skin folds.  Gently cleanse the affected areas daily with mild soap (eg, Nature by Canus, Vanicream bar soap) or soap substitutes (eg, Cetaphil).  Dry the areas well. Additionally, wearing absorbent cotton or fabric may help (as long as fabrics are changed if they become damp). Mild antiperspirants and non-talc drying powders may help, but these can cause further irritation in some individuals.  Barrier creams, such as zinc oxide paste (eg, Desitin, Triple Paste, Vusion), may be helpful for individuals who wear diapers or have problems with incontinence.  If overweight, weight loss is  recommended.  For persistent irritation, hydrocortisone 2.5% daily may help. Discontinue if the inflammation is no better after 2 weeks of use. Adding a topical antifungal to the hydrocortisone, such as clotrimazole cream, may help if yeast infection is suspected. Apply the barrier paste (if used) after application of these creams.    2. Encounter for screening for malignant neoplasm of skin  Scattered benign lesions    - Protective measures, such as avoiding skin exposure to sunlight during peak sun hours (10 AM to 3 PM), wearing protective clothing, and applying high-SPF sunscreen, are essential for reducing exposure to harmful ultraviolet (UV) light.  - Monthly self-examination of the skin is helpful to detect new lesions or changes in existing lesions.  - Discussed signs and symptoms of sun-related skin cancers.   - Make sure your moles are not signs of skin cancer (melanoma). Remember the ABCDEs of melanoma lesions:  A - Asymmetry: One half of the lesion does not mirror the other half.  B - Border: The borders are irregular or vague (indistinct).  C - Color: More than one color may be noted within the mole.  D - Diameter: Size greater than 6 mm (roughly the size of a pencil eraser) may be concerning.  E - Evolving: Notable changes in the lesion over time are suspicious signs for skin cancer.    3. Melanocytic nevus, unspecified location  Uniform pigmented macule(s)/papule(s) with reassuring findings on dermoscopy    -Discussed nature of condition  -Reassurance, benign-appearing features on examination today  -Recommend continued observation    4. Seborrheic keratosis  Stuck on verrucous, tan-brown papules and plaques.      Although Seborrheic Keratoses can be troublesome and unsightly, they are entirely benign.  Removal of Seborrheic Keratoses is considered a cosmetic procedure. Removal is typically performed using liquid nitrogen cryotherapy.  Treatment of current lesions does not prevent the development of  new Seborrheic Keratoses in the future.    5. Hemangioma of skin  Violaceous/red papule with maroon lagoons     - A cherry hemangioma is a small macule (small, flat, smooth area) or papule (small, solid bump) formed from an overgrowth of tiny blood vessels in the skin. Cherry hemangiomas are characteristically red or purplish in color. They often first appear in middle adulthood and usually increase in number with age. Cherry hemangiomas are noncancerous (benign) and are common in adults.  - Lesions are benign, reassured patient.     6. Lentigo  Scattered tan macules in sun-exposed areas.    A solar lentigo (plural, solar lentigines), sometimes called an age spot or liver spot, is a brown macule (small, flat, smooth area of skin) caused by chronic sun or artificial ultraviolet (UV) light exposure. There may be just one lentigo or there may be multiple. This type of lentigo is different from lentigo simplex (discussed separately) because it is caused by exposure to UV light. Solar lentigines are benign, but they do indicate excessive sun exposure, a risk factor for the development of skin cancer.  Lesions are benign, no treatment needed.

## 2024-06-03 NOTE — PATIENT INSTRUCTIONS

## 2024-07-01 DIAGNOSIS — E78.2 MIXED HYPERLIPIDEMIA: ICD-10-CM

## 2024-07-01 RX ORDER — ATORVASTATIN CALCIUM 10 MG/1
10 TABLET, FILM COATED ORAL DAILY
Qty: 90 TABLET | Refills: 1 | Status: SHIPPED | OUTPATIENT
Start: 2024-07-01

## 2024-07-30 ENCOUNTER — APPOINTMENT (OUTPATIENT)
Dept: PRIMARY CARE | Facility: CLINIC | Age: 54
End: 2024-07-30
Payer: COMMERCIAL

## 2024-08-25 DIAGNOSIS — I10 HTN (HYPERTENSION), BENIGN: ICD-10-CM

## 2024-08-26 RX ORDER — LOSARTAN POTASSIUM 50 MG/1
50 TABLET ORAL DAILY
Qty: 90 TABLET | Refills: 0 | Status: SHIPPED | OUTPATIENT
Start: 2024-08-26

## 2024-09-09 ENCOUNTER — LAB (OUTPATIENT)
Dept: LAB | Facility: LAB | Age: 54
End: 2024-09-09
Payer: COMMERCIAL

## 2024-09-09 ENCOUNTER — APPOINTMENT (OUTPATIENT)
Dept: PRIMARY CARE | Facility: CLINIC | Age: 54
End: 2024-09-09
Payer: COMMERCIAL

## 2024-09-09 VITALS
HEART RATE: 66 BPM | DIASTOLIC BLOOD PRESSURE: 84 MMHG | HEIGHT: 74 IN | BODY MASS INDEX: 27.36 KG/M2 | SYSTOLIC BLOOD PRESSURE: 121 MMHG | OXYGEN SATURATION: 97 % | WEIGHT: 213.2 LBS

## 2024-09-09 DIAGNOSIS — E78.2 MIXED HYPERLIPIDEMIA: ICD-10-CM

## 2024-09-09 DIAGNOSIS — Z00.00 ENCOUNTER FOR ANNUAL GENERAL MEDICAL EXAMINATION WITHOUT ABNORMAL FINDINGS IN ADULT: Primary | ICD-10-CM

## 2024-09-09 DIAGNOSIS — M54.42 CHRONIC RIGHT-SIDED LOW BACK PAIN WITH LEFT-SIDED SCIATICA: ICD-10-CM

## 2024-09-09 DIAGNOSIS — E55.9 VITAMIN D DEFICIENCY: ICD-10-CM

## 2024-09-09 DIAGNOSIS — Z12.5 ENCOUNTER FOR SCREENING FOR MALIGNANT NEOPLASM OF PROSTATE: ICD-10-CM

## 2024-09-09 DIAGNOSIS — I10 ESSENTIAL (PRIMARY) HYPERTENSION: ICD-10-CM

## 2024-09-09 DIAGNOSIS — E53.8 VITAMIN B 12 DEFICIENCY: ICD-10-CM

## 2024-09-09 DIAGNOSIS — M54.16 CHRONIC LEFT-SIDED LUMBAR RADICULOPATHY: ICD-10-CM

## 2024-09-09 DIAGNOSIS — Z00.00 ENCOUNTER FOR ANNUAL GENERAL MEDICAL EXAMINATION WITHOUT ABNORMAL FINDINGS IN ADULT: ICD-10-CM

## 2024-09-09 DIAGNOSIS — G89.29 CHRONIC RIGHT-SIDED LOW BACK PAIN WITH LEFT-SIDED SCIATICA: ICD-10-CM

## 2024-09-09 PROBLEM — H61.23 BILATERAL IMPACTED CERUMEN: Status: RESOLVED | Noted: 2024-05-29 | Resolved: 2024-09-09

## 2024-09-09 PROCEDURE — 80053 COMPREHEN METABOLIC PANEL: CPT

## 2024-09-09 PROCEDURE — 84443 ASSAY THYROID STIM HORMONE: CPT

## 2024-09-09 PROCEDURE — 80061 LIPID PANEL: CPT

## 2024-09-09 PROCEDURE — 82306 VITAMIN D 25 HYDROXY: CPT

## 2024-09-09 PROCEDURE — 3008F BODY MASS INDEX DOCD: CPT | Performed by: FAMILY MEDICINE

## 2024-09-09 PROCEDURE — 3074F SYST BP LT 130 MM HG: CPT | Performed by: FAMILY MEDICINE

## 2024-09-09 PROCEDURE — 99396 PREV VISIT EST AGE 40-64: CPT | Performed by: FAMILY MEDICINE

## 2024-09-09 PROCEDURE — 3079F DIAST BP 80-89 MM HG: CPT | Performed by: FAMILY MEDICINE

## 2024-09-09 PROCEDURE — 93000 ELECTROCARDIOGRAM COMPLETE: CPT | Performed by: FAMILY MEDICINE

## 2024-09-09 PROCEDURE — 85025 COMPLETE CBC W/AUTO DIFF WBC: CPT

## 2024-09-09 PROCEDURE — 36415 COLL VENOUS BLD VENIPUNCTURE: CPT

## 2024-09-09 PROCEDURE — 82607 VITAMIN B-12: CPT

## 2024-09-09 PROCEDURE — 1036F TOBACCO NON-USER: CPT | Performed by: FAMILY MEDICINE

## 2024-09-09 ASSESSMENT — ENCOUNTER SYMPTOMS
APPETITE CHANGE: 0
JOINT SWELLING: 0
ACTIVITY CHANGE: 0
FATIGUE: 0
DIAPHORESIS: 0
DIZZINESS: 0
BACK PAIN: 0
AGITATION: 0
PALPITATIONS: 0
EYE REDNESS: 0
COUGH: 0
PHOTOPHOBIA: 0
VOICE CHANGE: 0
DIFFICULTY URINATING: 0
RHINORRHEA: 0
FREQUENCY: 0
NAUSEA: 0
UNEXPECTED WEIGHT CHANGE: 0
ALLERGIC/IMMUNOLOGIC NEGATIVE: 1
FLANK PAIN: 0
ARTHRALGIAS: 0
POLYDIPSIA: 0
NEUROLOGICAL NEGATIVE: 1
HEMATURIA: 0
SINUS PAIN: 0
BRUISES/BLEEDS EASILY: 0
VOMITING: 0
NECK STIFFNESS: 0
DIARRHEA: 0
SLEEP DISTURBANCE: 0
WOUND: 0
EYE ITCHING: 0
SINUS PRESSURE: 0
LOSS OF SENSATION IN FEET: 0
TROUBLE SWALLOWING: 0
OCCASIONAL FEELINGS OF UNSTEADINESS: 0
EYE DISCHARGE: 0
ADENOPATHY: 0
APNEA: 0
FEVER: 0
FACIAL SWELLING: 0
DYSURIA: 0
EYE PAIN: 0
POLYPHAGIA: 0
BLOOD IN STOOL: 0
CHILLS: 0
SORE THROAT: 0
SHORTNESS OF BREATH: 0
WHEEZING: 0
CHEST TIGHTNESS: 0
ABDOMINAL PAIN: 0
MYALGIAS: 0
DEPRESSION: 0
COLOR CHANGE: 0
CONSTIPATION: 0
CHOKING: 0
NERVOUS/ANXIOUS: 0

## 2024-09-09 ASSESSMENT — PATIENT HEALTH QUESTIONNAIRE - PHQ9
SUM OF ALL RESPONSES TO PHQ9 QUESTIONS 1 AND 2: 0
2. FEELING DOWN, DEPRESSED OR HOPELESS: NOT AT ALL
1. LITTLE INTEREST OR PLEASURE IN DOING THINGS: NOT AT ALL
10. IF YOU CHECKED OFF ANY PROBLEMS, HOW DIFFICULT HAVE THESE PROBLEMS MADE IT FOR YOU TO DO YOUR WORK, TAKE CARE OF THINGS AT HOME, OR GET ALONG WITH OTHER PEOPLE: NOT DIFFICULT AT ALL

## 2024-09-09 ASSESSMENT — PROMIS GLOBAL HEALTH SCALE
EMOTIONAL_PROBLEMS: NEVER
RATE_GENERAL_HEALTH: GOOD
RATE_PHYSICAL_HEALTH: GOOD
RATE_QUALITY_OF_LIFE: GOOD
RATE_AVERAGE_PAIN: 1
CARRYOUT_SOCIAL_ACTIVITIES: GOOD
CARRYOUT_PHYSICAL_ACTIVITIES: COMPLETELY
RATE_SOCIAL_SATISFACTION: GOOD
RATE_MENTAL_HEALTH: GOOD

## 2024-09-09 ASSESSMENT — PAIN SCALES - GENERAL: PAINLEVEL: 0-NO PAIN

## 2024-09-09 NOTE — PROGRESS NOTES
Subjective   Patient ID: Soto Rojas is a 54 y.o. male who presents for Annual Exam.  Today he is accompanied by alone.     Well Adult Physical   Patient here for a comprehensive physical exam.The patient reports problems - continues to have left leg pain    Do you take any herbs or supplements that were not prescribed by a doctor? yes   Are you taking calcium supplements? no   Are you taking aspirin daily? no     History:  Date last PSA: July/2023    Past Medical History:  No date: Headache  No date: Hypertension  Social History    Socioeconomic History      Marital status:       Spouse name: Not on file      Number of children: Not on file      Years of education: Not on file      Highest education level: Not on file    Occupational History      Not on file    Tobacco Use      Smoking status: Never        Passive exposure: Never      Smokeless tobacco: Never    Substance and Sexual Activity      Alcohol use: Yes        Alcohol/week: 1.0 standard drink of alcohol        Types: 1 Glasses of wine per week        Comment: Social drinker      Drug use: Never      Sexual activity: Yes        Partners: Female        Birth control/protection: None    Other Topics      Concerns:        Not on file    Social History Narrative      Not on file    Social Determinants of Health  Financial Resource Strain: Not on file  Food Insecurity: Not on file  Transportation Needs: Not on file  Physical Activity: Not on file  Stress: Not on file  Social Connections: Not on file  Intimate Partner Violence: Not on file  Housing Stability: Not on file  Social Determinants of Health  Tobacco Use: Low Risk  (9/9/2024)      Patient History          Smoking Tobacco Use: Never          Smokeless Tobacco Use: Never          Passive Exposure: Never  Alcohol Use: Not on file  Financial Resource Strain: Not on file  Food Insecurity: Not on file  Transportation Needs: Not on file  Physical Activity: Not on file  Stress: Not on file  Social  Connections: Not on file  Intimate Partner Violence: Not on file  Depression: Not at risk (9/9/2024)      PHQ-2          PHQ-2 Score: 0  Housing Stability: Not on file  Utilities: Not on file  Digital Equity: Not on file  Health Literacy: Not on file    Immunization History  Administered            Date(s) Administered    Flu vaccine (IIV4), preservative free *Check age/dose*                          10/22/2022  11/27/2023      Influenza, Unspecified                          10/22/2022      Pfizer COVID-19 vaccine, bivalent, age 12 years and older (30 mcg/0.3 mL)                          10/31/2022      Pfizer Gray Cap SARS-CoV-2                          05/14/2022      Pfizer Purple Cap SARS-CoV-2                          03/27/2021 04/17/2021 12/17/2021      Tdap vaccine, age 7 year and older (BOOSTRIX, ADACEL)                          06/05/2021      Zoster vaccine, recombinant, adult (SHINGRIX)                          06/05/2021 08/20/2021    Review of patient's family history indicates:  Problem: Diabetes      Relation: Mother          Name: Phyllis              Age of Onset: (Not Specified)  Problem: Heart disease      Relation: Father          Name: Herman              Age of Onset: (Not Specified)  Problem: Hypertension      Relation: Father          Name: Herman              Age of Onset: (Not Specified)  Problem: Cancer      Relation: Sister          Name: Maritza              Age of Onset: (Not Specified)  Problem: Colon cancer      Relation: Sister          Name: Maritza              Age of Onset: (Not Specified)  Problem: Diabetes      Relation: Daughter          Name: Ileena              Age of Onset: (Not Specified)    Past Surgical History:  No date: BACK SURGERY  No date: WISDOM TOOTH EXTRACTION  Current Outpatient Medications: ·  atorvastatin (Lipitor) 10 mg tablet, TAKE 1 TABLET ONCE DAILY, Disp: 90 tablet, Rfl: 1·  biotin 5 mg tablet, Take 1 tablet (5 mg) by mouth once daily., Disp: , Rfl: ·   losartan (Cozaar) 50 mg tablet, TAKE 1 TABLET ONCE DAILY, Disp: 90 tablet, Rfl: 0·  multivitamin tablet, Take 1 tablet by mouth once daily., Disp: , Rfl:   No Known Allergies  Lab Results       Component                Value               Date                       WBC                      5.9                 05/29/2024                 HGB                      14.6                05/29/2024                 HCT                      43.5                05/29/2024                 PLT                      286                 05/29/2024                 CHOL                     155                 05/29/2024                 TRIG                     102                 05/29/2024                 HDL                      49.1                05/29/2024                 ALT                      34                  05/29/2024                 AST                      29                  05/29/2024                 NA                       142                 05/29/2024                 K                        4.2                 05/29/2024                 CL                       105                 05/29/2024                 CREATININE               0.99                05/29/2024                 BUN                      11                  05/29/2024                 CO2                      28                  05/29/2024                 TSH                      0.69                07/17/2023                 HGBA1C                   5.1                 11/21/2023             Medical Problems     Problem List     Mixed hyperlipidemia    Essential (primary) hypertension    Cardiac murmur    Chronic left-sided lumbar radiculopathy    Low back pain    Pain of left lower extremity    Bilateral impacted cerumen                      Review of Systems   Constitutional:  Negative for activity change, appetite change, chills, diaphoresis, fatigue, fever and unexpected weight change.   HENT:  Negative for congestion, dental problem,  "drooling, ear discharge, ear pain, facial swelling, hearing loss, nosebleeds, postnasal drip, rhinorrhea, sinus pressure, sinus pain, sneezing, sore throat, tinnitus, trouble swallowing and voice change.    Eyes:  Negative for photophobia, pain, discharge, redness, itching and visual disturbance.   Respiratory:  Negative for apnea, cough, choking, chest tightness, shortness of breath and wheezing.    Cardiovascular:  Negative for chest pain, palpitations and leg swelling.   Gastrointestinal:  Negative for abdominal pain, blood in stool, constipation, diarrhea, nausea and vomiting.   Endocrine: Negative for cold intolerance, heat intolerance, polydipsia, polyphagia and polyuria.   Genitourinary:  Negative for decreased urine volume, difficulty urinating, dysuria, enuresis, flank pain, frequency, genital sores, hematuria, penile discharge, penile pain, penile swelling, scrotal swelling, testicular pain and urgency.   Musculoskeletal:  Negative for arthralgias, back pain, gait problem, joint swelling, myalgias and neck stiffness.   Skin:  Negative for color change, pallor, rash and wound.   Allergic/Immunologic: Negative.    Neurological: Negative.  Negative for dizziness.   Hematological:  Negative for adenopathy. Does not bruise/bleed easily.   Psychiatric/Behavioral:  Negative for agitation, behavioral problems and sleep disturbance. The patient is not nervous/anxious.        Objective   /84   Pulse 66   Ht 1.88 m (6' 2\")   Wt 96.7 kg (213 lb 3.2 oz)   SpO2 97%   BMI 27.37 kg/m²   BSA: 2.25 meters squared  Growth percentiles: Facility age limit for growth %amador is 20 years. Facility age limit for growth %amdaor is 20 years.     Physical Exam  Vitals and nursing note reviewed.   Constitutional:       General: He is not in acute distress.     Appearance: Normal appearance. He is normal weight. He is not ill-appearing, toxic-appearing or diaphoretic.   HENT:      Head: Normocephalic.      Right Ear: Tympanic " membrane, ear canal and external ear normal. There is no impacted cerumen.      Left Ear: Tympanic membrane, ear canal and external ear normal. There is no impacted cerumen.      Nose: Nose normal. No congestion or rhinorrhea.      Mouth/Throat:      Mouth: Mucous membranes are moist.      Pharynx: Oropharynx is clear. No oropharyngeal exudate or posterior oropharyngeal erythema.   Eyes:      General: No scleral icterus.        Right eye: No discharge.         Left eye: No discharge.      Extraocular Movements: Extraocular movements intact.      Conjunctiva/sclera: Conjunctivae normal.      Pupils: Pupils are equal, round, and reactive to light.   Neck:      Vascular: No carotid bruit.   Cardiovascular:      Rate and Rhythm: Normal rate and regular rhythm.      Pulses: Normal pulses.      Heart sounds: Normal heart sounds. No murmur heard.     No friction rub. No gallop.   Pulmonary:      Effort: Pulmonary effort is normal. No respiratory distress.      Breath sounds: Normal breath sounds. No stridor. No wheezing, rhonchi or rales.   Chest:      Chest wall: No tenderness.   Abdominal:      General: Abdomen is flat. Bowel sounds are normal. There is no distension.      Palpations: Abdomen is soft. There is no mass.      Tenderness: There is no abdominal tenderness. There is no right CVA tenderness, left CVA tenderness, guarding or rebound.      Hernia: No hernia is present.   Musculoskeletal:         General: No swelling, tenderness, deformity or signs of injury. Normal range of motion.      Cervical back: Normal range of motion and neck supple. No rigidity or tenderness.      Right lower leg: No edema.      Left lower leg: No edema.   Lymphadenopathy:      Cervical: No cervical adenopathy.   Skin:     General: Skin is warm and dry.      Coloration: Skin is not jaundiced or pale.      Findings: No bruising, erythema, lesion or rash.   Neurological:      General: No focal deficit present.      Mental Status: He is  alert and oriented to person, place, and time.      Cranial Nerves: No cranial nerve deficit.      Sensory: No sensory deficit.      Motor: No weakness.      Coordination: Coordination normal.      Gait: Gait normal.      Deep Tendon Reflexes: Reflexes normal.   Psychiatric:         Mood and Affect: Mood normal.         Behavior: Behavior normal.         Thought Content: Thought content normal.         Judgment: Judgment normal.         Assessment/Plan   Problem List Items Addressed This Visit             ICD-10-CM    Mixed hyperlipidemia E78.2    Relevant Orders    TSH with reflex to Free T4 if abnormal    ECG 12 Lead    Follow Up In [facility] - PCP - Established    Essential (primary) hypertension I10    Relevant Orders    TSH with reflex to Free T4 if abnormal    ECG 12 Lead    Follow Up In [facility] - PCP - Established    Chronic left-sided lumbar radiculopathy M54.16    Relevant Orders    TSH with reflex to Free T4 if abnormal    Low back pain M54.50     Other Visit Diagnoses         Codes    Encounter for annual general medical examination without abnormal findings in adult    -  Primary Z00.00    Relevant Orders    Lipid Panel    CBC and Auto Differential    TSH with reflex to Free T4 if abnormal    Comprehensive Metabolic Panel    ECG 12 Lead    Vitamin D deficiency     E55.9    Relevant Orders    Vitamin D 25-Hydroxy,Total (for eval of Vitamin D levels)    Vitamin B 12 deficiency     E53.8    Relevant Orders    Vitamin B12    Encounter for screening for malignant neoplasm of prostate     Z12.5    Relevant Orders    Prostate Specific Antigen, Screen          Current Outpatient Medications   Medication Sig Dispense Refill    atorvastatin (Lipitor) 10 mg tablet TAKE 1 TABLET ONCE DAILY 90 tablet 1    biotin 5 mg tablet Take 1 tablet (5 mg) by mouth once daily.      losartan (Cozaar) 50 mg tablet TAKE 1 TABLET ONCE DAILY 90 tablet 0    multivitamin tablet Take 1 tablet by mouth once  daily.       No current facility-administered medications for this visit.     Healthy male exam.    1. Fasting labs today  Encouraged flu vaccine in October/November  2. Patient Counseling:  --Nutrition: Stressed importance of moderation in sodium/caffeine intake, saturated fat and cholesterol, caloric balance, sufficient intake of fresh fruits, vegetables, fiber, calcium, iron, and 1 mg of folate supplement per day (for females capable of pregnancy).  --Discussed the issue of estrogen replacement, calcium supplement, and the daily use of baby aspirin.  --Exercise: Stressed the importance of regular exercise.   --Substance Abuse: Discussed cessation/primary prevention of tobacco, alcohol, or other drug use; driving or other dangerous activities under the influence; availability of treatment for abuse.    --Sexuality: Discussed sexually transmitted diseases, partner selection, use of condoms, avoidance of unintended pregnancy  and contraceptive alternatives.   --Injury prevention: Discussed safety belts, safety helmets, smoke detector, smoking near bedding or upholstery.   --Dental health: Discussed importance of regular tooth brushing, flossing, and dental visits.  --Immunizations reviewed.  --Discussed benefits of screening colonoscopy.  --After hours service discussed with patient  3. Discussed the patient's BMI with him.  The BMI is above average. The patient received Current weight: 96.7 kg (213 lb 3.2 oz)  Weight change since last visit (-) denotes wt loss 2.7 lbs   Weight loss needed to achieve BMI 25: 18.9 Lbs  Weight loss needed to achieve BMI 30: -20 Lbs    Advised to Increase physical activity because they have an above normal BMI.  4. Follow up in 6 months for hypertension    Jocelyne Olivas MD

## 2024-09-09 NOTE — PATIENT INSTRUCTIONS
Healthy male exam.    1. Fasting labs today  Encouraged flu vaccine in October/November  2. Patient Counseling:  --Nutrition: Stressed importance of moderation in sodium/caffeine intake, saturated fat and cholesterol, caloric balance, sufficient intake of fresh fruits, vegetables, fiber, calcium, iron, and 1 mg of folate supplement per day (for females capable of pregnancy).  --Discussed the issue of estrogen replacement, calcium supplement, and the daily use of baby aspirin.  --Exercise: Stressed the importance of regular exercise.   --Substance Abuse: Discussed cessation/primary prevention of tobacco, alcohol, or other drug use; driving or other dangerous activities under the influence; availability of treatment for abuse.    --Sexuality: Discussed sexually transmitted diseases, partner selection, use of condoms, avoidance of unintended pregnancy  and contraceptive alternatives.   --Injury prevention: Discussed safety belts, safety helmets, smoke detector, smoking near bedding or upholstery.   --Dental health: Discussed importance of regular tooth brushing, flossing, and dental visits.  --Immunizations reviewed.  --Discussed benefits of screening colonoscopy.  --After hours service discussed with patient  3. Discussed the patient's BMI with him.  The BMI is above average. The patient received Current weight: 96.7 kg (213 lb 3.2 oz)  Weight change since last visit (-) denotes wt loss 2.7 lbs   Weight loss needed to achieve BMI 25: 18.9 Lbs  Weight loss needed to achieve BMI 30: -20 Lbs    Advised to Increase physical activity because they have an above normal BMI.  4. Follow up in 6 months for hypertension    Current Outpatient Medications   Medication Sig Dispense Refill    atorvastatin (Lipitor) 10 mg tablet TAKE 1 TABLET ONCE DAILY 90 tablet 1    biotin 5 mg tablet Take 1 tablet (5 mg) by mouth once daily.      losartan (Cozaar) 50 mg tablet TAKE 1 TABLET ONCE DAILY 90 tablet 0    multivitamin tablet Take 1  tablet by mouth once daily.       No current facility-administered medications for this visit.

## 2024-09-10 LAB
25(OH)D3 SERPL-MCNC: 39 NG/ML (ref 30–100)
ALBUMIN SERPL BCP-MCNC: 4.9 G/DL (ref 3.4–5)
ALP SERPL-CCNC: 77 U/L (ref 33–120)
ALT SERPL W P-5'-P-CCNC: 78 U/L (ref 10–52)
ANION GAP SERPL CALC-SCNC: 9 MMOL/L (ref 10–20)
AST SERPL W P-5'-P-CCNC: 37 U/L (ref 9–39)
BASOPHILS # BLD AUTO: 0.04 X10*3/UL (ref 0–0.1)
BASOPHILS NFR BLD AUTO: 0.6 %
BILIRUB SERPL-MCNC: 0.7 MG/DL (ref 0–1.2)
BUN SERPL-MCNC: 12 MG/DL (ref 6–23)
CALCIUM SERPL-MCNC: 10 MG/DL (ref 8.6–10.6)
CHLORIDE SERPL-SCNC: 105 MMOL/L (ref 98–107)
CHOLEST SERPL-MCNC: 172 MG/DL (ref 0–199)
CHOLESTEROL/HDL RATIO: 3.2
CO2 SERPL-SCNC: 32 MMOL/L (ref 21–32)
CREAT SERPL-MCNC: 1.08 MG/DL (ref 0.5–1.3)
EGFRCR SERPLBLD CKD-EPI 2021: 81 ML/MIN/1.73M*2
EOSINOPHIL # BLD AUTO: 0.07 X10*3/UL (ref 0–0.7)
EOSINOPHIL NFR BLD AUTO: 1.1 %
ERYTHROCYTE [DISTWIDTH] IN BLOOD BY AUTOMATED COUNT: 11.4 % (ref 11.5–14.5)
GLUCOSE SERPL-MCNC: 90 MG/DL (ref 74–99)
HCT VFR BLD AUTO: 45.4 % (ref 41–52)
HDLC SERPL-MCNC: 53.9 MG/DL
HGB BLD-MCNC: 15.3 G/DL (ref 13.5–17.5)
IMM GRANULOCYTES # BLD AUTO: 0.02 X10*3/UL (ref 0–0.7)
IMM GRANULOCYTES NFR BLD AUTO: 0.3 % (ref 0–0.9)
LDLC SERPL CALC-MCNC: 103 MG/DL
LYMPHOCYTES # BLD AUTO: 3 X10*3/UL (ref 1.2–4.8)
LYMPHOCYTES NFR BLD AUTO: 46.1 %
MCH RBC QN AUTO: 30.6 PG (ref 26–34)
MCHC RBC AUTO-ENTMCNC: 33.7 G/DL (ref 32–36)
MCV RBC AUTO: 91 FL (ref 80–100)
MONOCYTES # BLD AUTO: 0.47 X10*3/UL (ref 0.1–1)
MONOCYTES NFR BLD AUTO: 7.2 %
NEUTROPHILS # BLD AUTO: 2.91 X10*3/UL (ref 1.2–7.7)
NEUTROPHILS NFR BLD AUTO: 44.7 %
NON HDL CHOLESTEROL: 118 MG/DL (ref 0–149)
NRBC BLD-RTO: 0 /100 WBCS (ref 0–0)
PLATELET # BLD AUTO: 294 X10*3/UL (ref 150–450)
POTASSIUM SERPL-SCNC: 4.6 MMOL/L (ref 3.5–5.3)
PROT SERPL-MCNC: 7.3 G/DL (ref 6.4–8.2)
RBC # BLD AUTO: 5 X10*6/UL (ref 4.5–5.9)
SODIUM SERPL-SCNC: 141 MMOL/L (ref 136–145)
TRIGL SERPL-MCNC: 75 MG/DL (ref 0–149)
TSH SERPL-ACNC: 0.84 MIU/L (ref 0.44–3.98)
VIT B12 SERPL-MCNC: 830 PG/ML (ref 211–911)
VLDL: 15 MG/DL (ref 0–40)
WBC # BLD AUTO: 6.5 X10*3/UL (ref 4.4–11.3)

## 2024-09-11 DIAGNOSIS — R79.89 ABNORMAL LIVER FUNCTION TEST: ICD-10-CM

## 2024-09-26 ENCOUNTER — LAB (OUTPATIENT)
Dept: LAB | Facility: LAB | Age: 54
End: 2024-09-26
Payer: COMMERCIAL

## 2024-09-26 DIAGNOSIS — R79.89 ABNORMAL LIVER FUNCTION TEST: ICD-10-CM

## 2024-09-26 DIAGNOSIS — Z12.5 ENCOUNTER FOR SCREENING FOR MALIGNANT NEOPLASM OF PROSTATE: ICD-10-CM

## 2024-09-26 LAB
ALBUMIN SERPL BCP-MCNC: 4.6 G/DL (ref 3.4–5)
ALP SERPL-CCNC: 61 U/L (ref 33–120)
ALT SERPL W P-5'-P-CCNC: 26 U/L (ref 10–52)
AST SERPL W P-5'-P-CCNC: 22 U/L (ref 9–39)
BILIRUB DIRECT SERPL-MCNC: 0.2 MG/DL (ref 0–0.3)
BILIRUB SERPL-MCNC: 0.8 MG/DL (ref 0–1.2)
PROT SERPL-MCNC: 6.6 G/DL (ref 6.4–8.2)
PSA SERPL-MCNC: 0.35 NG/ML

## 2024-09-26 PROCEDURE — 80076 HEPATIC FUNCTION PANEL: CPT

## 2024-09-26 PROCEDURE — 84153 ASSAY OF PSA TOTAL: CPT

## 2024-09-26 PROCEDURE — 36415 COLL VENOUS BLD VENIPUNCTURE: CPT

## 2024-11-23 DIAGNOSIS — I10 HTN (HYPERTENSION), BENIGN: ICD-10-CM

## 2024-11-23 RX ORDER — LOSARTAN POTASSIUM 50 MG/1
50 TABLET ORAL DAILY
Qty: 90 TABLET | Refills: 1 | Status: SHIPPED | OUTPATIENT
Start: 2024-11-23

## 2025-03-10 ENCOUNTER — APPOINTMENT (OUTPATIENT)
Dept: PRIMARY CARE | Facility: CLINIC | Age: 55
End: 2025-03-10
Payer: COMMERCIAL

## 2025-03-10 VITALS
TEMPERATURE: 98.4 F | WEIGHT: 220.6 LBS | HEIGHT: 74 IN | SYSTOLIC BLOOD PRESSURE: 120 MMHG | BODY MASS INDEX: 28.31 KG/M2 | OXYGEN SATURATION: 96 % | HEART RATE: 76 BPM | DIASTOLIC BLOOD PRESSURE: 80 MMHG

## 2025-03-10 DIAGNOSIS — H61.21 IMPACTED CERUMEN OF RIGHT EAR: ICD-10-CM

## 2025-03-10 DIAGNOSIS — Z00.00 ENCOUNTER FOR ANNUAL GENERAL MEDICAL EXAMINATION WITHOUT ABNORMAL FINDINGS IN ADULT: ICD-10-CM

## 2025-03-10 DIAGNOSIS — E55.9 VITAMIN D DEFICIENCY: ICD-10-CM

## 2025-03-10 DIAGNOSIS — H93.13 TINNITUS OF BOTH EARS: ICD-10-CM

## 2025-03-10 DIAGNOSIS — E53.8 VITAMIN B 12 DEFICIENCY: ICD-10-CM

## 2025-03-10 DIAGNOSIS — H93.13 TINNITUS OF BOTH EARS: Primary | ICD-10-CM

## 2025-03-10 DIAGNOSIS — Z13.29 SCREENING FOR THYROID DISORDER: ICD-10-CM

## 2025-03-10 DIAGNOSIS — E78.2 MIXED HYPERLIPIDEMIA: ICD-10-CM

## 2025-03-10 DIAGNOSIS — I10 ESSENTIAL (PRIMARY) HYPERTENSION: Primary | ICD-10-CM

## 2025-03-10 DIAGNOSIS — Z13.1 SCREENING FOR DIABETES MELLITUS: ICD-10-CM

## 2025-03-10 PROBLEM — M54.50 LOW BACK PAIN: Status: RESOLVED | Noted: 2024-01-16 | Resolved: 2025-03-10

## 2025-03-10 PROBLEM — M79.605 PAIN OF LEFT LOWER EXTREMITY: Status: RESOLVED | Noted: 2024-01-16 | Resolved: 2025-03-10

## 2025-03-10 PROCEDURE — 1036F TOBACCO NON-USER: CPT | Performed by: FAMILY MEDICINE

## 2025-03-10 PROCEDURE — 99214 OFFICE O/P EST MOD 30 MIN: CPT | Performed by: FAMILY MEDICINE

## 2025-03-10 PROCEDURE — 3074F SYST BP LT 130 MM HG: CPT | Performed by: FAMILY MEDICINE

## 2025-03-10 PROCEDURE — 69209 REMOVE IMPACTED EAR WAX UNI: CPT | Performed by: FAMILY MEDICINE

## 2025-03-10 PROCEDURE — 3079F DIAST BP 80-89 MM HG: CPT | Performed by: FAMILY MEDICINE

## 2025-03-10 PROCEDURE — 3008F BODY MASS INDEX DOCD: CPT | Performed by: FAMILY MEDICINE

## 2025-03-10 ASSESSMENT — ENCOUNTER SYMPTOMS
VOICE CHANGE: 0
POLYDIPSIA: 0
UNEXPECTED WEIGHT CHANGE: 0
ADENOPATHY: 0
DIZZINESS: 0
CHILLS: 0
BRUISES/BLEEDS EASILY: 0
DIARRHEA: 0
VOMITING: 0
EYE REDNESS: 0
EYE ITCHING: 0
HEMATURIA: 0
AGITATION: 0
PALPITATIONS: 0
CONSTIPATION: 0
FEVER: 0
NECK STIFFNESS: 0
EYE PAIN: 0
SINUS PRESSURE: 0
TROUBLE SWALLOWING: 0
COUGH: 0
BACK PAIN: 0
SINUS PAIN: 0
FACIAL SWELLING: 0
ACTIVITY CHANGE: 0
COLOR CHANGE: 0
DYSURIA: 0
SHORTNESS OF BREATH: 0
POLYPHAGIA: 0
WOUND: 0
BLOOD IN STOOL: 0
JOINT SWELLING: 0
ABDOMINAL PAIN: 0
APPETITE CHANGE: 0
RHINORRHEA: 0
DIAPHORESIS: 0
NERVOUS/ANXIOUS: 0
NAUSEA: 0
EYE DISCHARGE: 0
SORE THROAT: 0
FATIGUE: 0
FLANK PAIN: 0
SLEEP DISTURBANCE: 0
MYALGIAS: 0
ARTHRALGIAS: 0
CHEST TIGHTNESS: 0
FREQUENCY: 0
WHEEZING: 0

## 2025-03-10 NOTE — PROGRESS NOTES
Subjective   Patient ID: Soto Rojas is a 54 y.o. male who presents for Follow-up (HTN).  Today he is accompanied by alone.     HPI  Subjective:   Soto Rojas is a 54 y.o. male with hypertension.  Current Outpatient Medications   Medication Sig Dispense Refill    losartan (Cozaar) 50 mg tablet Take 1 tablet (50 mg) by mouth once daily. 90 tablet 1    multivitamin tablet Take 1 tablet by mouth once daily.       No current facility-administered medications for this visit.      Hypertension ROS: taking medications as instructed, no medication side effects noted, no TIA's, no chest pain on exertion, no dyspnea on exertion, no swelling of ankles, no orthostatic dizziness or lightheadedness, no orthopnea or paroxysmal nocturnal dyspnea, and no palpitations.   New concerns: stopped Atorvastatin.   Past Medical History:   Diagnosis Date    Headache     Hypertension     Pain of left lower extremity 01/16/2024     Social History     Socioeconomic History    Marital status:      Spouse name: Not on file    Number of children: Not on file    Years of education: Not on file    Highest education level: Not on file   Occupational History    Not on file   Tobacco Use    Smoking status: Never     Passive exposure: Never    Smokeless tobacco: Never   Substance and Sexual Activity    Alcohol use: Yes     Alcohol/week: 1.0 standard drink of alcohol     Types: 1 Glasses of wine per week     Comment: Social drinker    Drug use: Never    Sexual activity: Yes     Partners: Female     Birth control/protection: None   Other Topics Concern    Not on file   Social History Narrative    Not on file     Social Drivers of Health     Financial Resource Strain: Not on file   Food Insecurity: Not on file   Transportation Needs: Not on file   Physical Activity: Not on file   Stress: Not on file   Social Connections: Not on file   Intimate Partner Violence: Not on file   Housing Stability: Not on file     Social Drivers of Health      Tobacco Use: Low Risk  (3/10/2025)    Patient History     Smoking Tobacco Use: Never     Smokeless Tobacco Use: Never     Passive Exposure: Never   Alcohol Use: Not on file   Financial Resource Strain: Not on file   Food Insecurity: Not on file   Transportation Needs: Not on file   Physical Activity: Not on file   Stress: Not on file   Social Connections: Not on file   Intimate Partner Violence: Not on file   Depression: Not at risk (9/9/2024)    PHQ-2     PHQ-2 Score: 0   Housing Stability: Not on file   Utilities: Not on file   Digital Equity: Not on file   Health Literacy: Not on file     Immunization History   Administered Date(s) Administered    COVID-19, mRNA, LNP-S, PF, 30 mcg/0.3 mL dose 03/27/2021, 04/17/2021, 12/17/2021    Flu vaccine (IIV4), preservative free *Check age/dose* 10/22/2022, 11/27/2023    Flu vaccine, trivalent, preservative free, age 6 months and greater (Fluarix/Fluzone/Flulaval) 10/31/2024    Influenza, Unspecified 10/22/2022    Pfizer COVID-19 vaccine, 12 years and older, (30mcg/0.3mL) (Comirnaty) 11/16/2024    Pfizer COVID-19 vaccine, bivalent, age 12 years and older (30 mcg/0.3 mL) 10/31/2022    Pfizer Gray Cap SARS-CoV-2 05/14/2022    Tdap vaccine, age 7 year and older (BOOSTRIX, ADACEL) 06/05/2021    Zoster vaccine, recombinant, adult (SHINGRIX) 06/05/2021, 08/20/2021     Family History   Problem Relation Name Age of Onset    Diabetes Mother Phyllis     Heart disease Father Herman     Hypertension Father Herman     Cancer Sister Maritza     Colon cancer Sister Maritza     Diabetes Daughter Ileena      Past Surgical History:   Procedure Laterality Date    BACK SURGERY      WISDOM TOOTH EXTRACTION         Current Outpatient Medications:     losartan (Cozaar) 50 mg tablet, Take 1 tablet (50 mg) by mouth once daily., Disp: 90 tablet, Rfl: 1    multivitamin tablet, Take 1 tablet by mouth once daily., Disp: , Rfl:   No Known Allergies  Patient Active Problem List   Diagnosis    Mixed  "hyperlipidemia    Essential (primary) hypertension    Cardiac murmur    Chronic left-sided lumbar radiculopathy    Tinnitus of both ears       Lab Results   Component Value Date    WBC 6.5 09/09/2024    HGB 15.3 09/09/2024    HCT 45.4 09/09/2024     09/09/2024    CHOL 172 09/09/2024    TRIG 75 09/09/2024    HDL 53.9 09/09/2024    ALT 26 09/26/2024    AST 22 09/26/2024     09/09/2024    K 4.6 09/09/2024     09/09/2024    CREATININE 1.08 09/09/2024    BUN 12 09/09/2024    CO2 32 09/09/2024    TSH 0.84 09/09/2024    HGBA1C 5.1 11/21/2023     Social Drivers of Health     Tobacco Use: Low Risk  (3/10/2025)    Patient History     Smoking Tobacco Use: Never     Smokeless Tobacco Use: Never     Passive Exposure: Never   Alcohol Use: Not on file   Financial Resource Strain: Not on file   Food Insecurity: Not on file   Transportation Needs: Not on file   Physical Activity: Not on file   Stress: Not on file   Social Connections: Not on file   Intimate Partner Violence: Not on file   Depression: Not at risk (9/9/2024)    PHQ-2     PHQ-2 Score: 0   Housing Stability: Not on file   Utilities: Not on file   Digital Equity: Not on file   Health Literacy: Not on file       Objective:   /83   Pulse 76   Temp 36.9 °C (98.4 °F) (Oral)   Ht 1.88 m (6' 2\")   Wt 100 kg (220 lb 9.6 oz)   SpO2 96%   BMI 28.32 kg/m²      Review of Systems   Constitutional:  Negative for activity change, appetite change, chills, diaphoresis, fatigue, fever and unexpected weight change.   HENT:  Positive for hearing loss and tinnitus. Negative for congestion, dental problem, drooling, ear discharge, ear pain, facial swelling, mouth sores, nosebleeds, postnasal drip, rhinorrhea, sinus pressure, sinus pain, sneezing, sore throat, trouble swallowing and voice change.    Eyes:  Negative for pain, discharge, redness, itching and visual disturbance.   Respiratory:  Negative for cough, chest tightness, shortness of breath and wheezing.  " "  Cardiovascular:  Negative for chest pain, palpitations and leg swelling.   Gastrointestinal:  Negative for abdominal pain, blood in stool, constipation, diarrhea, nausea and vomiting.   Endocrine: Negative for cold intolerance, heat intolerance, polydipsia, polyphagia and polyuria.   Genitourinary:  Negative for decreased urine volume, dysuria, flank pain, frequency, hematuria and urgency.   Musculoskeletal:  Negative for arthralgias, back pain, gait problem, joint swelling, myalgias and neck stiffness.   Skin:  Negative for color change, pallor, rash and wound.   Neurological:  Negative for dizziness.   Hematological:  Negative for adenopathy. Does not bruise/bleed easily.   Psychiatric/Behavioral:  Negative for agitation, behavioral problems and sleep disturbance. The patient is not nervous/anxious.    Patient ID: Soto Rojas is a 54 y.o. male.    Ear Cerumen Removal    Date/Time: 3/10/2025 9:45 AM    Performed by: Jocelyne Olivas MD  Authorized by: Jocelyne Olivas MD    Consent:     Consent obtained:  Verbal    Consent given by:  Patient    Risks, benefits, and alternatives were discussed: yes      Risks discussed:  Bleeding, infection, pain, TM perforation, incomplete removal and dizziness    Alternatives discussed:  No treatment, delayed treatment, alternative treatment, observation and referral  Universal protocol:     Procedure explained and questions answered to patient or proxy's satisfaction: yes      Patient identity confirmed:  Verbally with patient  Procedure details:     Location:  R ear    Procedure type: irrigation      Procedure outcomes: cerumen removed    Post-procedure details:     Inspection:  TM intact and ear canal clear    Hearing quality:  Normal    Procedure completion:  Tolerated well, no immediate complications      Objective   /83   Pulse 76   Temp 36.9 °C (98.4 °F) (Oral)   Ht 1.88 m (6' 2\")   Wt 100 kg (220 lb 9.6 oz)   SpO2 96%   BMI 28.32 kg/m²     Physical " Exam  Vitals and nursing note reviewed.   Constitutional:       General: He is not in acute distress.     Appearance: Normal appearance. He is not ill-appearing, toxic-appearing or diaphoretic.   HENT:      Head: Normocephalic and atraumatic.      Right Ear: Tympanic membrane, ear canal and external ear normal. There is impacted cerumen.      Left Ear: Tympanic membrane, ear canal and external ear normal. There is no impacted cerumen.      Nose: Nose normal. No congestion or rhinorrhea.      Mouth/Throat:      Mouth: Mucous membranes are moist.      Pharynx: Oropharynx is clear. No oropharyngeal exudate or posterior oropharyngeal erythema.   Eyes:      General: No scleral icterus.        Right eye: No discharge.         Left eye: No discharge.      Extraocular Movements: Extraocular movements intact.      Conjunctiva/sclera: Conjunctivae normal.      Pupils: Pupils are equal, round, and reactive to light.   Neck:      Vascular: No carotid bruit.   Cardiovascular:      Rate and Rhythm: Normal rate and regular rhythm.      Pulses: Normal pulses.      Heart sounds: Normal heart sounds. No murmur heard.     No friction rub. No gallop.   Pulmonary:      Effort: Pulmonary effort is normal. No respiratory distress.      Breath sounds: Normal breath sounds. No stridor. No wheezing, rhonchi or rales.   Chest:      Chest wall: No tenderness.   Musculoskeletal:         General: Normal range of motion.      Cervical back: Normal range of motion and neck supple. No rigidity or tenderness.      Right lower leg: No edema.      Left lower leg: No edema.   Lymphadenopathy:      Cervical: No cervical adenopathy.   Skin:     General: Skin is warm and dry.   Neurological:      General: No focal deficit present.      Mental Status: He is alert and oriented to person, place, and time.   Psychiatric:         Mood and Affect: Mood normal.         Behavior: Behavior normal.         Thought Content: Thought content normal.         Judgment:  Judgment normal.         Assessment/Plan   Problem List Items Addressed This Visit             ICD-10-CM    Mixed hyperlipidemia E78.2    Essential (primary) hypertension - Primary I10    Tinnitus of both ears H93.13    Relevant Orders    Referral to ENT     Other Visit Diagnoses         Codes    Encounter for annual general medical examination without abnormal findings in adult     Z00.00    Relevant Orders    Hemoglobin A1C    Lipid Panel    CBC and Auto Differential    Comprehensive Metabolic Panel    Vitamin D deficiency     E55.9    Relevant Orders    Vitamin D 25-Hydroxy,Total (for eval of Vitamin D levels)    Vitamin B 12 deficiency     E53.8    Relevant Orders    Vitamin B12    Screening for diabetes mellitus     Z13.1    Relevant Orders    Hemoglobin A1C    Screening for thyroid disorder     Z13.29    Relevant Orders    TSH with reflex to Free T4 if abnormal    Impacted cerumen of right ear     H61.21          F/U in 6 months for Annual wellness and HTN  Continue current medications  Call if any new concerns  Fasting labs prior to next visit and now  Reviewed consultation reports.  Immunizations revised  Labs reviewed  ENT referral placed  Try Debrox ear drops once a month  Ear irrigation in the right ear

## 2025-03-10 NOTE — PATIENT INSTRUCTIONS
F/U in 6 months for Annual wellness and HTN  Continue current medications  Call if any new concerns  Fasting labs prior to next visit  Reviewed consultation reports.  Immunizations revised  Labs reviewed  ENT referral placed  Try Debrox ear drops once a month    Current Outpatient Medications   Medication Sig Dispense Refill    losartan (Cozaar) 50 mg tablet Take 1 tablet (50 mg) by mouth once daily. 90 tablet 1    multivitamin tablet Take 1 tablet by mouth once daily.       No current facility-administered medications for this visit.

## 2025-03-11 LAB
25(OH)D3+25(OH)D2 SERPL-MCNC: 30 NG/ML (ref 30–100)
ALBUMIN SERPL-MCNC: 4.7 G/DL (ref 3.6–5.1)
ALP SERPL-CCNC: 66 U/L (ref 35–144)
ALT SERPL-CCNC: 19 U/L (ref 9–46)
ANION GAP SERPL CALCULATED.4IONS-SCNC: 7 MMOL/L (CALC) (ref 7–17)
AST SERPL-CCNC: 20 U/L (ref 10–35)
BASOPHILS # BLD AUTO: 37 CELLS/UL (ref 0–200)
BASOPHILS NFR BLD AUTO: 0.6 %
BILIRUB SERPL-MCNC: 0.6 MG/DL (ref 0.2–1.2)
BUN SERPL-MCNC: 12 MG/DL (ref 7–25)
CALCIUM SERPL-MCNC: 9.3 MG/DL (ref 8.6–10.3)
CHLORIDE SERPL-SCNC: 104 MMOL/L (ref 98–110)
CHOLEST SERPL-MCNC: 228 MG/DL
CHOLEST/HDLC SERPL: 4.6 (CALC)
CO2 SERPL-SCNC: 30 MMOL/L (ref 20–32)
CREAT SERPL-MCNC: 1 MG/DL (ref 0.7–1.3)
EGFRCR SERPLBLD CKD-EPI 2021: 89 ML/MIN/1.73M2
EOSINOPHIL # BLD AUTO: 68 CELLS/UL (ref 15–500)
EOSINOPHIL NFR BLD AUTO: 1.1 %
ERYTHROCYTE [DISTWIDTH] IN BLOOD BY AUTOMATED COUNT: 11.9 % (ref 11–15)
EST. AVERAGE GLUCOSE BLD GHB EST-MCNC: 111 MG/DL
EST. AVERAGE GLUCOSE BLD GHB EST-SCNC: 6.2 MMOL/L
GLUCOSE SERPL-MCNC: 87 MG/DL (ref 65–99)
HBA1C MFR BLD: 5.5 % OF TOTAL HGB
HCT VFR BLD AUTO: 43.2 % (ref 38.5–50)
HDLC SERPL-MCNC: 50 MG/DL
HGB BLD-MCNC: 14.5 G/DL (ref 13.2–17.1)
LDLC SERPL CALC-MCNC: 158 MG/DL (CALC)
LYMPHOCYTES # BLD AUTO: 2771 CELLS/UL (ref 850–3900)
LYMPHOCYTES NFR BLD AUTO: 44.7 %
MCH RBC QN AUTO: 30.3 PG (ref 27–33)
MCHC RBC AUTO-ENTMCNC: 33.6 G/DL (ref 32–36)
MCV RBC AUTO: 90.4 FL (ref 80–100)
MONOCYTES # BLD AUTO: 397 CELLS/UL (ref 200–950)
MONOCYTES NFR BLD AUTO: 6.4 %
NEUTROPHILS # BLD AUTO: 2926 CELLS/UL (ref 1500–7800)
NEUTROPHILS NFR BLD AUTO: 47.2 %
NONHDLC SERPL-MCNC: 178 MG/DL (CALC)
PLATELET # BLD AUTO: 306 THOUSAND/UL (ref 140–400)
PMV BLD REES-ECKER: 10.3 FL (ref 7.5–12.5)
POTASSIUM SERPL-SCNC: 4.4 MMOL/L (ref 3.5–5.3)
PROT SERPL-MCNC: 7.1 G/DL (ref 6.1–8.1)
RBC # BLD AUTO: 4.78 MILLION/UL (ref 4.2–5.8)
SODIUM SERPL-SCNC: 141 MMOL/L (ref 135–146)
TRIGL SERPL-MCNC: 96 MG/DL
TSH SERPL-ACNC: 0.87 MIU/L (ref 0.4–4.5)
VIT B12 SERPL-MCNC: 798 PG/ML (ref 200–1100)
WBC # BLD AUTO: 6.2 THOUSAND/UL (ref 3.8–10.8)

## 2025-03-12 NOTE — RESULT ENCOUNTER NOTE
Labs are normal except for cholesterol is high again.  Needs to resume his medication which was atorvastatin daily.  If not tolerating then let me know we will switch it to Crestor.  Will need to recheck lipid panel and CMP prior to next visit.

## 2025-05-22 DIAGNOSIS — I10 HTN (HYPERTENSION), BENIGN: ICD-10-CM

## 2025-05-22 RX ORDER — LOSARTAN POTASSIUM 50 MG/1
50 TABLET ORAL DAILY
Qty: 90 TABLET | Refills: 1 | Status: SHIPPED | OUTPATIENT
Start: 2025-05-22

## 2025-06-05 ENCOUNTER — APPOINTMENT (OUTPATIENT)
Dept: DERMATOLOGY | Facility: CLINIC | Age: 55
End: 2025-06-05
Payer: COMMERCIAL

## 2025-06-05 DIAGNOSIS — D22.9 MELANOCYTIC NEVUS, UNSPECIFIED LOCATION: Primary | ICD-10-CM

## 2025-06-05 DIAGNOSIS — L82.1 SEBORRHEIC KERATOSIS: ICD-10-CM

## 2025-06-05 DIAGNOSIS — Z12.83 ENCOUNTER FOR SCREENING FOR MALIGNANT NEOPLASM OF SKIN: ICD-10-CM

## 2025-06-05 DIAGNOSIS — L81.4 LENTIGO: ICD-10-CM

## 2025-06-05 DIAGNOSIS — D18.01 HEMANGIOMA OF SKIN: ICD-10-CM

## 2025-06-05 PROCEDURE — 99213 OFFICE O/P EST LOW 20 MIN: CPT | Performed by: NURSE PRACTITIONER

## 2025-06-05 RX ORDER — ATORVASTATIN CALCIUM 10 MG/1
10 TABLET, FILM COATED ORAL DAILY
COMMUNITY

## 2025-06-05 ASSESSMENT — DERMATOLOGY QUALITY OF LIFE (QOL) ASSESSMENT
WHAT SINGLE SKIN CONDITION LISTED BELOW IS THE PATIENT ANSWERING THE QUALITY-OF-LIFE ASSESSMENT QUESTIONS ABOUT: NONE OF THE ABOVE
WHAT SINGLE SKIN CONDITION LISTED BELOW IS THE PATIENT ANSWERING THE QUALITY-OF-LIFE ASSESSMENT QUESTIONS ABOUT: NONE OF THE ABOVE
RATE HOW BOTHERED YOU ARE BY EFFECTS OF YOUR SKIN PROBLEMS ON YOUR ACTIVITIES (EG, GOING OUT, ACCOMPLISHING WHAT YOU WANT, WORK ACTIVITIES OR YOUR RELATIONSHIPS WITH OTHERS): 0 - NEVER BOTHERED
RATE HOW BOTHERED YOU ARE BY SYMPTOMS OF YOUR SKIN PROBLEM (EG, ITCHING, STINGING BURNING, HURTING OR SKIN IRRITATION): 1
RATE HOW EMOTIONALLY BOTHERED YOU ARE BY YOUR SKIN PROBLEM (FOR EXAMPLE, WORRY, EMBARRASSMENT, FRUSTRATION): 0 - NEVER BOTHERED
RATE HOW EMOTIONALLY BOTHERED YOU ARE BY YOUR SKIN PROBLEM (FOR EXAMPLE, WORRY, EMBARRASSMENT, FRUSTRATION): 0 - NEVER BOTHERED
RATE HOW BOTHERED YOU ARE BY SYMPTOMS OF YOUR SKIN PROBLEM (EG, ITCHING, STINGING BURNING, HURTING OR SKIN IRRITATION): 1
RATE HOW BOTHERED YOU ARE BY EFFECTS OF YOUR SKIN PROBLEMS ON YOUR ACTIVITIES (EG, GOING OUT, ACCOMPLISHING WHAT YOU WANT, WORK ACTIVITIES OR YOUR RELATIONSHIPS WITH OTHERS): 0 - NEVER BOTHERED

## 2025-06-05 ASSESSMENT — PATIENT GLOBAL ASSESSMENT (PGA): WHAT IS THE PGA: PATIENT GLOBAL ASSESSMENT:  1 - CLEAR

## 2025-07-12 ENCOUNTER — PATIENT MESSAGE (OUTPATIENT)
Dept: PRIMARY CARE | Facility: CLINIC | Age: 55
End: 2025-07-12
Payer: COMMERCIAL

## 2025-07-12 DIAGNOSIS — E78.2 MIXED HYPERLIPIDEMIA: ICD-10-CM

## 2025-07-16 RX ORDER — ATORVASTATIN CALCIUM 10 MG/1
10 TABLET, FILM COATED ORAL DAILY
Qty: 90 TABLET | Refills: 0 | Status: SHIPPED | OUTPATIENT
Start: 2025-07-16

## 2025-09-08 ENCOUNTER — APPOINTMENT (OUTPATIENT)
Dept: PRIMARY CARE | Facility: CLINIC | Age: 55
End: 2025-09-08
Payer: COMMERCIAL

## 2025-12-04 ENCOUNTER — APPOINTMENT (OUTPATIENT)
Dept: DERMATOLOGY | Facility: CLINIC | Age: 55
End: 2025-12-04
Payer: COMMERCIAL